# Patient Record
Sex: MALE | Race: WHITE | NOT HISPANIC OR LATINO | Employment: FULL TIME | RURAL
[De-identification: names, ages, dates, MRNs, and addresses within clinical notes are randomized per-mention and may not be internally consistent; named-entity substitution may affect disease eponyms.]

---

## 2019-01-28 ENCOUNTER — CONVERSION ENCOUNTER (OUTPATIENT)
Dept: FAMILY MEDICINE CLINIC | Facility: CLINIC | Age: 34
End: 2019-01-28

## 2019-01-28 LAB
ALBUMIN SERPL-MCNC: 4.5 G/DL (ref 3.6–5.1)
ALP SERPL-CCNC: 63 U/L (ref 40–115)
ALT SERPL-CCNC: 12 U/L (ref 9–46)
AST SERPL-CCNC: 12 U/L (ref 10–40)
BASOPHILS # BLD AUTO: 22 CELLS/UL (ref 0–200)
BASOPHILS NFR BLD AUTO: 0.5 %
BILIRUB SERPL-MCNC: 0.6 MG/DL (ref 0.2–1.2)
BUN SERPL-MCNC: 9 MG/DL (ref 7–25)
BUN/CREAT SERPL: ABNORMAL (CALC) (ref 6–22)
CALCIUM SERPL-MCNC: 9.8 MG/DL (ref 8.6–10.3)
CHLORIDE SERPL-SCNC: 103 MMOL/L (ref 98–110)
CHOLEST SERPL-MCNC: 146 MG/DL
CHOLEST/HDLC SERPL: 3 (CALC)
CONV CO2: 32 MMOL/L (ref 20–32)
CONV TOTAL PROTEIN: 6.9 G/DL (ref 6.1–8.1)
CREAT UR-MCNC: 1.08 MG/DL (ref 0.6–1.35)
EOSINOPHIL # BLD AUTO: 0.9 %
EOSINOPHIL # BLD AUTO: 39 CELLS/UL (ref 15–500)
ERYTHROCYTE [DISTWIDTH] IN BLOOD BY AUTOMATED COUNT: 11.8 % (ref 11–15)
GLOBULIN UR ELPH-MCNC: 2.4 MG/DL (ref 1.9–3.7)
GLUCOSE UR QL: 63 MG/DL (ref 65–99)
HCT VFR BLD AUTO: 45 % (ref 38.5–50)
HDLC SERPL-MCNC: 48 MG/DL
HGB BLD-MCNC: 15.3 G/DL (ref 13.2–17.1)
INSULIN SERPL-ACNC: 1.9 (CALC) (ref 1–2.5)
LDLC SERPL CALC-MCNC: 80 MG/DL
LYMPHOCYTES # BLD AUTO: 1389 CELLS/UL (ref 850–3900)
LYMPHOCYTES NFR BLD AUTO: 32.3 %
MCH RBC QN AUTO: 29.7 PG (ref 27–33)
MCHC RBC AUTO-ENTMCNC: 34 G/DL (ref 32–36)
MCV RBC AUTO: 87.2 FL (ref 80–100)
MONOCYTES # BLD AUTO: 469 CELLS/UL (ref 200–950)
MONOCYTES NFR BLD AUTO: 10.9 %
NEUTROPHILS # BLD AUTO: 2382 CELLS/UL (ref 1500–7800)
NEUTROPHILS NFR BLD AUTO: 55.4 %
NONHDLC SERPL-MCNC: 98 MG/DL
PLATELET # BLD AUTO: 298 10*3/UL (ref 140–400)
PMV BLD AUTO: 10.8 FL (ref 7.5–12.5)
POTASSIUM SERPL-SCNC: 4.3 MMOL/L (ref 3.5–5.3)
RBC # BLD AUTO: 5.16 MILLION/UL (ref 4.2–5.8)
SODIUM SERPL-SCNC: 141 MMOL/L (ref 135–146)
T3 SERPL-MCNC: 83 NG/DL (ref 76–181)
T4 FREE SERPL-MCNC: 1.1 NG/DL (ref 0.8–1.8)
TESTOST FREE MFR SERPL: 43.9 PG/ML (ref 35–155)
TESTOST SERPL-MCNC: 404 NG/DL (ref 250–1100)
THYROGLOB AB SERPL-ACNC: <1 IU/ML
THYROPEROXIDASE AB SERPL-ACNC: <1 IU/ML
TRIGL SERPL-MCNC: 96 MG/DL
TSH SERPL-ACNC: 0.4 MIU/L (ref 0.4–4.5)
WBC # BLD AUTO: 4.3 10*3/UL (ref 3.8–10.8)

## 2019-10-18 DIAGNOSIS — F41.9 ANXIETY: Primary | ICD-10-CM

## 2019-10-27 RX ORDER — BUSPIRONE HYDROCHLORIDE 15 MG/1
TABLET ORAL
Qty: 60 TABLET | Refills: 0 | OUTPATIENT
Start: 2019-10-27

## 2019-10-28 RX ORDER — BUSPIRONE HYDROCHLORIDE 15 MG/1
TABLET ORAL
Qty: 60 TABLET | Refills: 0 | OUTPATIENT
Start: 2019-10-28

## 2019-12-20 RX ORDER — ESCITALOPRAM OXALATE 20 MG/1
TABLET ORAL
Qty: 30 TABLET | OUTPATIENT
Start: 2019-12-20

## 2019-12-27 DIAGNOSIS — F41.9 ANXIETY: Primary | ICD-10-CM

## 2019-12-27 PROBLEM — M79.609 LIMB PAIN: Status: ACTIVE | Noted: 2019-12-27

## 2019-12-27 RX ORDER — BUSPIRONE HYDROCHLORIDE 15 MG/1
15 TABLET ORAL 2 TIMES DAILY
Qty: 12 TABLET | Refills: 0 | Status: SHIPPED | OUTPATIENT
Start: 2019-12-27 | End: 2019-12-30 | Stop reason: SDUPTHER

## 2019-12-27 RX ORDER — ESCITALOPRAM OXALATE 20 MG/1
20 TABLET ORAL DAILY
COMMUNITY
Start: 2019-11-21 | End: 2019-12-27 | Stop reason: SDUPTHER

## 2019-12-27 RX ORDER — BUSPIRONE HYDROCHLORIDE 15 MG/1
15 TABLET ORAL 2 TIMES DAILY
Refills: 11 | COMMUNITY
Start: 2019-09-23 | End: 2019-12-27 | Stop reason: SDUPTHER

## 2019-12-27 RX ORDER — ESCITALOPRAM OXALATE 20 MG/1
20 TABLET ORAL DAILY
Qty: 6 TABLET | Refills: 0 | Status: SHIPPED | OUTPATIENT
Start: 2019-12-27 | End: 2019-12-30 | Stop reason: SDUPTHER

## 2019-12-27 NOTE — TELEPHONE ENCOUNTER
REQUESTING REFILLS ON HIS BUSPAR 15MG AND LEXAPRO 20MG, PLEASE SEND TO [facility] pharmacy here in [place]

## 2019-12-30 ENCOUNTER — OFFICE VISIT (OUTPATIENT)
Dept: FAMILY MEDICINE CLINIC | Facility: CLINIC | Age: 34
End: 2019-12-30

## 2019-12-30 VITALS
DIASTOLIC BLOOD PRESSURE: 66 MMHG | WEIGHT: 171 LBS | OXYGEN SATURATION: 99 % | BODY MASS INDEX: 21.94 KG/M2 | HEART RATE: 98 BPM | TEMPERATURE: 98.6 F | HEIGHT: 74 IN | RESPIRATION RATE: 16 BRPM | SYSTOLIC BLOOD PRESSURE: 102 MMHG

## 2019-12-30 DIAGNOSIS — F41.9 ANXIETY: ICD-10-CM

## 2019-12-30 DIAGNOSIS — F43.0 ACUTE REACTION TO STRESS: Primary | ICD-10-CM

## 2019-12-30 DIAGNOSIS — F17.200 TOBACCO USE DISORDER: ICD-10-CM

## 2019-12-30 PROBLEM — E05.90 HYPERTHYROIDISM: Status: ACTIVE | Noted: 2019-12-30

## 2019-12-30 PROBLEM — R63.6 UNDERWEIGHT: Status: ACTIVE | Noted: 2019-12-30

## 2019-12-30 PROCEDURE — 99214 OFFICE O/P EST MOD 30 MIN: CPT | Performed by: FAMILY MEDICINE

## 2019-12-30 RX ORDER — AZITHROMYCIN 250 MG/1
TABLET, FILM COATED ORAL
Qty: 6 TABLET | Refills: 0 | Status: SHIPPED | OUTPATIENT
Start: 2019-12-30 | End: 2020-11-03

## 2019-12-30 RX ORDER — MIRTAZAPINE 7.5 MG/1
TABLET, FILM COATED ORAL
Qty: 30 TABLET | Refills: 5 | Status: SHIPPED | OUTPATIENT
Start: 2019-12-30 | End: 2020-11-03

## 2019-12-30 RX ORDER — BUSPIRONE HYDROCHLORIDE 15 MG/1
TABLET ORAL
Qty: 12 TABLET | Refills: 0 | OUTPATIENT
Start: 2019-12-30

## 2019-12-30 RX ORDER — ESCITALOPRAM OXALATE 20 MG/1
20 TABLET ORAL DAILY
Qty: 90 TABLET | Refills: 3 | Status: SHIPPED | OUTPATIENT
Start: 2019-12-30 | End: 2020-07-15

## 2019-12-30 RX ORDER — BUSPIRONE HYDROCHLORIDE 15 MG/1
15 TABLET ORAL 2 TIMES DAILY
Qty: 180 TABLET | Refills: 3 | Status: SHIPPED | OUTPATIENT
Start: 2019-12-30 | End: 2020-11-03 | Stop reason: ALTCHOICE

## 2019-12-30 RX ORDER — ESCITALOPRAM OXALATE 20 MG/1
20 TABLET ORAL DAILY
Qty: 6 TABLET | Refills: 0 | OUTPATIENT
Start: 2019-12-30

## 2019-12-30 NOTE — PROGRESS NOTES
Subjective   Seng Grimaldo is a 34 y.o. male.     Chief Complaint   Patient presents with   • Anxiety       Anxiety   Presents for follow-up visit. Symptoms include depressed mood, excessive worry, nervous/anxious behavior and restlessness. Patient reports no chest pain, nausea, palpitations, shortness of breath or suicidal ideas. Symptoms occur constantly. The severity of symptoms is moderate. Nighttime awakenings: several.     Compliance with medications is %.            I personally reviewed and updated the patient's allergies, medications, problem list, and past medical, surgical, social, and family history.     Family History   Problem Relation Age of Onset   • Heart attack Mother 30   • Heart attack Father 44   • Diabetes Paternal Grandmother    • Heart disease Paternal Grandfather         Ischemic        Social History     Tobacco Use   • Smoking status: Never Smoker   • Smokeless tobacco: Current User     Types: Chew   • Tobacco comment: 1 can per day   Substance Use Topics   • Alcohol use: Not Currently   • Drug use: Defer       History reviewed. No pertinent surgical history.    Patient Active Problem List   Diagnosis   • Limb pain   • Acute reaction to stress   • Underweight   • Tobacco use disorder   • Hyperthyroidism         Current Outpatient Medications:   •  busPIRone (BUSPAR) 15 MG tablet, Take 1 tablet by mouth 2 (Two) Times a Day., Disp: 180 tablet, Rfl: 3  •  escitalopram (LEXAPRO) 20 MG tablet, Take 1 tablet by mouth Daily., Disp: 90 tablet, Rfl: 3  •  azithromycin (ZITHROMAX) 250 MG tablet, Take 2 tablets the first day, then 1 tablet daily for 4 days., Disp: 6 tablet, Rfl: 0  •  mirtazapine (REMERON) 7.5 MG tablet, Take 1/2 to 1 tablet nightly as needed, Disp: 30 tablet, Rfl: 5         Review of Systems   Constitutional: Negative for chills and diaphoresis.   Eyes: Negative for visual disturbance.   Respiratory: Negative for shortness of breath.    Cardiovascular: Negative for chest pain  "and palpitations.   Gastrointestinal: Negative for abdominal pain and nausea.   Endocrine: Negative for polydipsia and polyphagia.   Musculoskeletal: Negative for neck stiffness.   Skin: Negative for color change and pallor.   Neurological: Negative for seizures and syncope.   Hematological: Negative for adenopathy.   Psychiatric/Behavioral: Positive for depressed mood. Negative for hallucinations and suicidal ideas. The patient is nervous/anxious.        Objective   /66   Pulse 98   Temp 98.6 °F (37 °C)   Resp 16   Ht 188 cm (74\")   Wt 77.6 kg (171 lb)   SpO2 99%   BMI 21.96 kg/m²   Wt Readings from Last 3 Encounters:   12/30/19 77.6 kg (171 lb)   01/18/19 75.4 kg (166 lb 4 oz)   03/23/18 73.1 kg (161 lb 4 oz)     Physical Exam   Constitutional: He is oriented to person, place, and time. He appears well-developed and well-nourished.   Cardiovascular: Normal rate, regular rhythm, S1 normal, S2 normal, normal heart sounds, intact distal pulses and normal pulses. Exam reveals no gallop and no friction rub.   No murmur heard.  Pulmonary/Chest: Effort normal and breath sounds normal. No accessory muscle usage or stridor. He has no decreased breath sounds. He has no wheezes. He has no rhonchi. He has no rales.   Abdominal: Soft. Normal appearance, normal aorta and bowel sounds are normal. He exhibits no distension, no pulsatile midline mass and no mass. There is no hepatosplenomegaly. There is no tenderness. There is no rigidity, no rebound, no guarding, no CVA tenderness and negative Bernard's sign. No hernia.   Neurological: He is alert and oriented to person, place, and time. Coordination and gait normal.   Skin: Skin is warm and dry. Turgor is normal. He is not diaphoretic. No pallor.         Assessment/Plan     Acute bacterial sinusitis.  Start antibiotics.  Anxiety.  Overall stable on Lexapro, buspirone.  Good social support.  Add PRN mirtazapine.  Remote history of prescription narcotic abuse, " resolved.  Follow-up recheck  Subclinical hyperthyroidism.  History of, thyroid uptake scan normal 2013.  Thyroid levels have normalized, continue to monitor  Recommend follow-up visit for comprehensive physical, and screening test.  Tobacco use.  chews.  Encourage cessation.  See dentist regularly.    Problem List Items Addressed This Visit        Other    Acute reaction to stress - Primary    Relevant Medications    busPIRone (BUSPAR) 15 MG tablet    escitalopram (LEXAPRO) 20 MG tablet    mirtazapine (REMERON) 7.5 MG tablet    Tobacco use disorder      Other Visit Diagnoses     Anxiety        Relevant Medications    busPIRone (BUSPAR) 15 MG tablet    escitalopram (LEXAPRO) 20 MG tablet              Expected course, medications, and adverse effects discussed.  Call or return if worsening or persistent symptoms.

## 2020-01-24 ENCOUNTER — OFFICE VISIT (OUTPATIENT)
Dept: FAMILY MEDICINE CLINIC | Facility: CLINIC | Age: 35
End: 2020-01-24

## 2020-01-24 VITALS
HEIGHT: 74 IN | HEART RATE: 75 BPM | WEIGHT: 170.8 LBS | SYSTOLIC BLOOD PRESSURE: 122 MMHG | OXYGEN SATURATION: 97 % | BODY MASS INDEX: 21.92 KG/M2 | DIASTOLIC BLOOD PRESSURE: 76 MMHG | TEMPERATURE: 98.8 F | RESPIRATION RATE: 16 BRPM

## 2020-01-24 DIAGNOSIS — R39.12 BENIGN PROSTATIC HYPERPLASIA WITH WEAK URINARY STREAM: ICD-10-CM

## 2020-01-24 DIAGNOSIS — F17.200 TOBACCO USE DISORDER: ICD-10-CM

## 2020-01-24 DIAGNOSIS — N40.1 BENIGN PROSTATIC HYPERPLASIA WITH WEAK URINARY STREAM: ICD-10-CM

## 2020-01-24 DIAGNOSIS — Z00.01 ANNUAL VISIT FOR GENERAL ADULT MEDICAL EXAMINATION WITH ABNORMAL FINDINGS: Primary | ICD-10-CM

## 2020-01-24 DIAGNOSIS — Z80.42 FAMILY HISTORY OF PROSTATE CANCER: ICD-10-CM

## 2020-01-24 DIAGNOSIS — R63.6 UNDERWEIGHT: ICD-10-CM

## 2020-01-24 DIAGNOSIS — Z20.5 EXPOSURE TO HEPATITIS A: ICD-10-CM

## 2020-01-24 DIAGNOSIS — E05.90 HYPERTHYROIDISM: ICD-10-CM

## 2020-01-24 LAB
BILIRUB BLD-MCNC: ABNORMAL MG/DL
CLARITY, POC: CLEAR
COLOR UR: ABNORMAL
GLUCOSE UR STRIP-MCNC: NEGATIVE MG/DL
KETONES UR QL: NEGATIVE
LEUKOCYTE EST, POC: ABNORMAL
NITRITE UR-MCNC: NEGATIVE MG/ML
PH UR: 7 [PH] (ref 5–8)
PROT UR STRIP-MCNC: ABNORMAL MG/DL
RBC # UR STRIP: NEGATIVE /UL
SP GR UR: 1.03 (ref 1–1.03)
UROBILINOGEN UR QL: ABNORMAL

## 2020-01-24 PROCEDURE — 81002 URINALYSIS NONAUTO W/O SCOPE: CPT | Performed by: FAMILY MEDICINE

## 2020-01-24 PROCEDURE — 99213 OFFICE O/P EST LOW 20 MIN: CPT | Performed by: FAMILY MEDICINE

## 2020-01-24 PROCEDURE — 99395 PREV VISIT EST AGE 18-39: CPT | Performed by: FAMILY MEDICINE

## 2020-01-24 RX ORDER — VARDENAFIL HYDROCHLORIDE 20 MG/1
TABLET ORAL
Qty: 12 TABLET | Refills: 11 | Status: SHIPPED | OUTPATIENT
Start: 2020-01-24 | End: 2020-12-28

## 2020-01-24 RX ORDER — TAMSULOSIN HYDROCHLORIDE 0.4 MG/1
1 CAPSULE ORAL NIGHTLY
Qty: 30 CAPSULE | Refills: 12 | Status: SHIPPED | OUTPATIENT
Start: 2020-01-24 | End: 2020-11-03

## 2020-01-24 RX ORDER — MELOXICAM 15 MG/1
15 TABLET ORAL DAILY
Qty: 30 TABLET | Refills: 12 | Status: SHIPPED | OUTPATIENT
Start: 2020-01-24 | End: 2020-11-03

## 2020-01-24 NOTE — PROGRESS NOTES
Subjective   Seng Grimaldo is a 34 y.o. male.     Chief Complaint   Patient presents with   • Annual Exam       The patient is here: to discuss health maintenance and disease prevention to follow up on multiple medical problems.  Last comprehensive physical was on 1/18/2019.  Previous physical was performed by  Shahid Rousseau MD  Overall has: moderate activity with work/home activities, good appetite, decreased energy level and is sleeping poorly. Nutrition: balanced diet. Last tetanus shot was unknown.     History of Present Illness     Recent Hospitalizations:  No hospitalization(s) within the last year..  ccc      I personally reviewed and updated the patient's allergies, medications, problem list, and past medical, surgical, social, and family history.     Family History   Problem Relation Age of Onset   • Heart attack Mother 30   • Heart attack Father 44   • Diabetes Paternal Grandmother    • Heart disease Paternal Grandfather         Ischemic        Social History     Tobacco Use   • Smoking status: Never Smoker   • Smokeless tobacco: Current User     Types: Chew   • Tobacco comment: 1 can per day   Substance Use Topics   • Alcohol use: Not Currently   • Drug use: Defer       Past Surgical History:   Procedure Laterality Date   • FRACTURE SURGERY Left     Crush injury, left leg, status post fasciotomy, no hardware in place       Patient Active Problem List   Diagnosis   • Limb pain   • Acute reaction to stress   • Underweight   • Tobacco use disorder   • Hyperthyroidism   • Annual visit for general adult medical examination with abnormal findings   • Benign prostatic hyperplasia with weak urinary stream         Current Outpatient Medications:   •  busPIRone (BUSPAR) 15 MG tablet, Take 1 tablet by mouth 2 (Two) Times a Day., Disp: 180 tablet, Rfl: 3  •  escitalopram (LEXAPRO) 20 MG tablet, Take 1 tablet by mouth Daily., Disp: 90 tablet, Rfl: 3  •  azithromycin (ZITHROMAX) 250 MG tablet, Take 2 tablets the  "first day, then 1 tablet daily for 4 days., Disp: 6 tablet, Rfl: 0  •  meloxicam (MOBIC) 15 MG tablet, Take 1 tablet by mouth Daily. As needed, Disp: 30 tablet, Rfl: 12  •  mirtazapine (REMERON) 7.5 MG tablet, Take 1/2 to 1 tablet nightly as needed, Disp: 30 tablet, Rfl: 5  •  tamsulosin (FLOMAX) 0.4 MG capsule 24 hr capsule, Take 1 capsule by mouth Every Night., Disp: 30 capsule, Rfl: 12  •  vardenafil (LEVITRA) 20 MG tablet, Take 1/2 to 1 tablet every 72 hours as needed, Disp: 12 tablet, Rfl: 11         Review of Systems   Constitutional: Negative for chills and diaphoresis.   HENT: Negative for trouble swallowing and voice change.    Eyes: Negative for visual disturbance.   Respiratory: Negative for shortness of breath.    Cardiovascular: Negative for chest pain and palpitations.   Gastrointestinal: Negative for abdominal pain and nausea.   Endocrine: Negative for polydipsia and polyphagia.   Genitourinary: Negative for hematuria.   Musculoskeletal: Negative for neck stiffness.   Skin: Negative for color change and pallor.   Allergic/Immunologic: Negative for immunocompromised state.   Neurological: Negative for seizures and syncope.   Hematological: Negative for adenopathy.   Psychiatric/Behavioral: Negative for hallucinations, sleep disturbance and suicidal ideas.       Objective   /76   Pulse 75   Temp 98.8 °F (37.1 °C)   Resp 16   Ht 188 cm (74\")   Wt 77.5 kg (170 lb 12.8 oz)   SpO2 97%   BMI 21.93 kg/m²   Wt Readings from Last 3 Encounters:   01/24/20 77.5 kg (170 lb 12.8 oz)   12/30/19 77.6 kg (171 lb)   01/18/19 75.4 kg (166 lb 4 oz)     Physical Exam   Constitutional: He is oriented to person, place, and time. He appears well-developed and well-nourished.   HENT:   Head: Normocephalic.   Right Ear: Tympanic membrane, external ear and ear canal normal.   Left Ear: Tympanic membrane, external ear and ear canal normal.   Nose: Nose normal.   Eyes: Pupils are equal, round, and reactive to light. " Conjunctivae, EOM and lids are normal.   Neck: No JVD present. Carotid bruit is not present. No tracheal deviation present. No thyromegaly present.   Cardiovascular: Normal rate, regular rhythm, normal heart sounds and intact distal pulses. Exam reveals no gallop and no friction rub.   No murmur heard.  Pulmonary/Chest: Effort normal and breath sounds normal. No stridor. He has no decreased breath sounds. He has no wheezes. He has no rales.   Abdominal: Soft. Bowel sounds are normal. He exhibits no distension and no mass. There is no tenderness. There is no rebound and no guarding. No hernia.   Lymphadenopathy:        Head (right side): No submental, no submandibular, no tonsillar, no preauricular, no posterior auricular and no occipital adenopathy present.        Head (left side): No submental, no submandibular, no tonsillar, no preauricular, no posterior auricular and no occipital adenopathy present.     He has no cervical adenopathy.   Neurological: He is alert and oriented to person, place, and time. He has normal strength and normal reflexes. No cranial nerve deficit or sensory deficit. Coordination and gait normal.   Skin: Skin is warm and dry. Turgor is normal. He is not diaphoretic. No pallor.       Recent Lab Results:          Lab Results   Component Value Date    CHOL 146 01/28/2019    TRIG 96 01/28/2019    HDL 48 01/28/2019     LDL Cholesterol    Date Value Ref Range Status   01/28/2019 80 NR Final   01/18/2019 72 <130 mg/dL Final   02/16/2018 74 NR Final     No results found for: PSA  Lab Results   Component Value Date    WBC 4.3 01/28/2019    HGB 15.3 01/28/2019    HCT 45.0 01/28/2019    MCV 87.2 01/28/2019     01/28/2019     Lab Results   Component Value Date    TSH 0.40 01/28/2019     Lab Results   Component Value Date    BUN 9 01/28/2019    CREATININE 1.08 01/28/2019    EGFRIFNONA 89 01/28/2019    EGFRIFAFRI 103 01/28/2019    BCR NOT APPLICABLE 01/28/2019    K 4.3 01/28/2019    CO2 32  01/28/2019    CALCIUM 9.8 01/28/2019    ALBUMIN 4.5 01/28/2019    AST 12 01/28/2019    ALT 12 01/28/2019         Age-appropriate Screening Schedule:  Refer to the list below for future screening recommendations based on patient's age, sex and/or medical conditions. Orders for these recommended tests are listed in the plan section. The patient has been provided with a written plan.    Health Maintenance   Topic Date Due   • TDAP/TD VACCINES (1 - Tdap) 08/17/1995   • INFLUENZA VACCINE  08/01/2019   • LIPID PANEL  01/28/2020           Assessment/Plan       Physical.  Doing well.  Vaccines declined.  Start daily health maintenance, screening test, lifestyle modification.  Anxiety.  Overall stable on Lexapro, buspirone.  Good social support.    Did not tolerate PRN mirtazapine.  Remote history of prescription narcotic abuse, resolved.  Follow-up recheck  Subclinical hyperthyroidism.  History of, thyroid uptake scan normal 2013.  Thyroid levels have normalized, continue to monitor  Tobacco use.  chews.  Encourage cessation.  See dentist regularly.  BPH.  Has had urology eval in the past.  Stop chewing.  Check PSA.  Start Flomax.  Start PRN Levitra.  Follow-up recheck.  Call or return if worsening symptoms.  Recommend urology follow-up  Sleep disorder.  Did not tolerate mirtazapine.  Has seen [facility], sleep eval upcoming.     Problem List Items Addressed This Visit        Endocrine    Hyperthyroidism    Relevant Orders    TSH    T4, Free       Genitourinary    Benign prostatic hyperplasia with weak urinary stream       Other    Underweight    Tobacco use disorder    Annual visit for general adult medical examination with abnormal findings - Primary    Relevant Orders    POCT urinalysis dipstick, manual (Completed)    CBC & Differential    Comprehensive Metabolic Panel    Lipid Panel With / Chol / HDL Ratio      Other Visit Diagnoses     Exposure to hepatitis A        Relevant Orders    Hepatitis A Antibody, Total     Hepatitis A Antibody, IgM    Family history of prostate cancer        Relevant Orders    PSA Screen              Expected course, medications, and adverse effects discussed.  Call or return if worsening or persistent symptoms.

## 2020-02-01 LAB
ALBUMIN SERPL-MCNC: 4.5 G/DL (ref 4–5)
ALBUMIN/GLOB SERPL: 2.3 {RATIO} (ref 1.2–2.2)
ALP SERPL-CCNC: 72 IU/L (ref 39–117)
ALT SERPL-CCNC: 19 IU/L (ref 0–44)
AST SERPL-CCNC: 13 IU/L (ref 0–40)
BASOPHILS # BLD AUTO: 0 X10E3/UL (ref 0–0.2)
BASOPHILS NFR BLD AUTO: 0 %
BILIRUB SERPL-MCNC: 0.5 MG/DL (ref 0–1.2)
BUN SERPL-MCNC: 10 MG/DL (ref 6–20)
BUN/CREAT SERPL: 10 (ref 9–20)
CALCIUM SERPL-MCNC: 9.5 MG/DL (ref 8.7–10.2)
CHLORIDE SERPL-SCNC: 103 MMOL/L (ref 96–106)
CHOLEST SERPL-MCNC: 142 MG/DL (ref 100–199)
CHOLEST/HDLC SERPL: 3.2 RATIO (ref 0–5)
CO2 SERPL-SCNC: 25 MMOL/L (ref 20–29)
CREAT SERPL-MCNC: 1.04 MG/DL (ref 0.76–1.27)
EOSINOPHIL # BLD AUTO: 0 X10E3/UL (ref 0–0.4)
EOSINOPHIL NFR BLD AUTO: 1 %
ERYTHROCYTE [DISTWIDTH] IN BLOOD BY AUTOMATED COUNT: 13.2 % (ref 11.6–15.4)
GLOBULIN SER CALC-MCNC: 2 G/DL (ref 1.5–4.5)
GLUCOSE SERPL-MCNC: 72 MG/DL (ref 65–99)
HAV AB SER QL IA: NEGATIVE
HAV IGM SERPL QL IA: NEGATIVE
HCT VFR BLD AUTO: 45.8 % (ref 37.5–51)
HDLC SERPL-MCNC: 44 MG/DL
HGB BLD-MCNC: 15.1 G/DL (ref 13–17.7)
IMM GRANULOCYTES # BLD AUTO: 0 X10E3/UL (ref 0–0.1)
IMM GRANULOCYTES NFR BLD AUTO: 0 %
LDLC SERPL CALC-MCNC: 80 MG/DL (ref 0–99)
LYMPHOCYTES # BLD AUTO: 1.3 X10E3/UL (ref 0.7–3.1)
LYMPHOCYTES NFR BLD AUTO: 35 %
MCH RBC QN AUTO: 29.6 PG (ref 26.6–33)
MCHC RBC AUTO-ENTMCNC: 33 G/DL (ref 31.5–35.7)
MCV RBC AUTO: 90 FL (ref 79–97)
MONOCYTES # BLD AUTO: 0.4 X10E3/UL (ref 0.1–0.9)
MONOCYTES NFR BLD AUTO: 11 %
NEUTROPHILS # BLD AUTO: 2 X10E3/UL (ref 1.4–7)
NEUTROPHILS NFR BLD AUTO: 53 %
PLATELET # BLD AUTO: 281 X10E3/UL (ref 150–450)
POTASSIUM SERPL-SCNC: 4.6 MMOL/L (ref 3.5–5.2)
PROT SERPL-MCNC: 6.5 G/DL (ref 6–8.5)
PSA SERPL-MCNC: 0.6 NG/ML (ref 0–4)
RBC # BLD AUTO: 5.1 X10E6/UL (ref 4.14–5.8)
SODIUM SERPL-SCNC: 144 MMOL/L (ref 134–144)
T4 FREE SERPL-MCNC: 1.25 NG/DL (ref 0.82–1.77)
TRIGL SERPL-MCNC: 92 MG/DL (ref 0–149)
TSH SERPL DL<=0.005 MIU/L-ACNC: 0.17 UIU/ML (ref 0.45–4.5)
VLDLC SERPL CALC-MCNC: 18 MG/DL (ref 5–40)
WBC # BLD AUTO: 3.8 X10E3/UL (ref 3.4–10.8)

## 2020-02-03 ENCOUNTER — TELEPHONE (OUTPATIENT)
Dept: FAMILY MEDICINE CLINIC | Facility: CLINIC | Age: 35
End: 2020-02-03

## 2020-02-03 NOTE — TELEPHONE ENCOUNTER
----- Message from Shahid Rousseau MD sent at 2/2/2020  9:07 AM EST -----  Let him know his blood work overall looks good, blood count, kidney function, cholesterol are normal.  His PSA blood test screening for prostate cancer is negative.Test for exposure to hepatitis A is negative.His thyroid levels are mildly hyperactive.  These are not high enough to cause him any symptoms.  This has happened to him in the past and his thyroid uptake scan was normal in 2013.  Plan to recheck his labs in 1 year.  We will consider repeating a scan if he remains hyperthyroid.

## 2020-07-15 DIAGNOSIS — F41.9 ANXIETY: ICD-10-CM

## 2020-07-15 RX ORDER — ESCITALOPRAM OXALATE 20 MG/1
20 TABLET ORAL DAILY
Qty: 90 TABLET | Refills: 1 | Status: SHIPPED | OUTPATIENT
Start: 2020-07-15 | End: 2020-11-03 | Stop reason: ALTCHOICE

## 2020-11-03 ENCOUNTER — OFFICE VISIT (OUTPATIENT)
Dept: FAMILY MEDICINE CLINIC | Facility: CLINIC | Age: 35
End: 2020-11-03

## 2020-11-03 VITALS
RESPIRATION RATE: 18 BRPM | OXYGEN SATURATION: 98 % | HEIGHT: 72 IN | WEIGHT: 165.2 LBS | HEART RATE: 92 BPM | BODY MASS INDEX: 22.37 KG/M2 | TEMPERATURE: 98.4 F | SYSTOLIC BLOOD PRESSURE: 122 MMHG | DIASTOLIC BLOOD PRESSURE: 70 MMHG

## 2020-11-03 DIAGNOSIS — F43.0 ACUTE REACTION TO STRESS: Primary | ICD-10-CM

## 2020-11-03 DIAGNOSIS — R40.4 STARING EPISODES: ICD-10-CM

## 2020-11-03 DIAGNOSIS — R41.3 MEMORY LOSS: ICD-10-CM

## 2020-11-03 DIAGNOSIS — R51.9 NONINTRACTABLE HEADACHE, UNSPECIFIED CHRONICITY PATTERN, UNSPECIFIED HEADACHE TYPE: ICD-10-CM

## 2020-11-03 DIAGNOSIS — F17.200 TOBACCO USE DISORDER: ICD-10-CM

## 2020-11-03 PROCEDURE — 99214 OFFICE O/P EST MOD 30 MIN: CPT | Performed by: FAMILY MEDICINE

## 2020-11-03 RX ORDER — VENLAFAXINE HYDROCHLORIDE 150 MG/1
150 CAPSULE, EXTENDED RELEASE ORAL DAILY
Qty: 300 CAPSULE | Refills: 2 | Status: SHIPPED | OUTPATIENT
Start: 2020-11-03 | End: 2021-04-13

## 2020-11-03 RX ORDER — IBUPROFEN 800 MG/1
TABLET ORAL
Qty: 90 TABLET | Refills: 0 | Status: SHIPPED | OUTPATIENT
Start: 2020-11-03 | End: 2020-11-23

## 2020-11-03 NOTE — PROGRESS NOTES
Subjective   Seng Grimaldo is a 35 y.o. male.     Chief Complaint   Patient presents with   • Anxiety       Anxiety  Presents for follow-up visit. Symptoms include depressed mood, excessive worry, nervous/anxious behavior and restlessness. Patient reports no chest pain, nausea, palpitations, shortness of breath or suicidal ideas. Symptoms occur constantly. The severity of symptoms is moderate. Nighttime awakenings: several.     Compliance with medications is %.            I personally reviewed and updated the patient's allergies, medications, problem list, and past medical, surgical, social, and family history. I have reviewed and confirmed the accuracy of the History of Present Illness and Review of Symptoms as documented by the MA/LPN/RN. Shahid Rousseau MD    Family History   Problem Relation Age of Onset   • Heart attack Mother 30   • Heart attack Father 44   • Diabetes Paternal Grandmother    • Heart disease Paternal Grandfather         Ischemic        Social History     Tobacco Use   • Smoking status: Never Smoker   • Smokeless tobacco: Current User     Types: Chew   • Tobacco comment: 1 can per day   Substance Use Topics   • Alcohol use: Not Currently   • Drug use: Defer       Past Surgical History:   Procedure Laterality Date   • FRACTURE SURGERY Left     Crush injury, left leg, status post fasciotomy, no hardware in place       Patient Active Problem List   Diagnosis   • Limb pain   • Acute reaction to stress   • Underweight   • Tobacco use disorder   • Hyperthyroidism   • Annual visit for general adult medical examination with abnormal findings   • Benign prostatic hyperplasia with weak urinary stream         Current Outpatient Medications:   •  ibuprofen (ADVIL,MOTRIN) 800 MG tablet, TAKE 1 TABLET BY MOUTH EVERY 6 HOURS AS NEEDED FOR PAIN, Disp: 90 tablet, Rfl: 0  •  vardenafil (LEVITRA) 20 MG tablet, Take 1/2 to 1 tablet every 72 hours as needed, Disp: 12 tablet, Rfl: 11  •  venlafaxine XR (Effexor  "XR) 150 MG 24 hr capsule, Take 1 capsule by mouth Daily., Disp: 300 capsule, Rfl: 2         Review of Systems   Constitutional: Negative for chills and diaphoresis.   Eyes: Negative for visual disturbance.   Respiratory: Negative for shortness of breath.    Cardiovascular: Negative for chest pain and palpitations.   Gastrointestinal: Negative for abdominal pain and nausea.   Endocrine: Negative for polydipsia and polyphagia.   Musculoskeletal: Negative for neck stiffness.   Skin: Negative for color change and pallor.   Neurological: Negative for seizures and syncope.   Hematological: Negative for adenopathy.   Psychiatric/Behavioral: Positive for depressed mood. Negative for hallucinations and suicidal ideas. The patient is nervous/anxious.        I have reviewed and confirmed the accuracy of the ROS as documented by the MA/LPN/RN Shahid Rousseau MD      Objective   /70 (BP Location: Left arm, Patient Position: Sitting, Cuff Size: Adult)   Pulse 92   Temp 98.4 °F (36.9 °C) (Temporal)   Resp 18   Ht 182.9 cm (72\")   Wt 74.9 kg (165 lb 3.2 oz)   SpO2 98% Comment: room air  BMI 22.41 kg/m²   BP Readings from Last 3 Encounters:   11/03/20 122/70   01/24/20 122/76   12/30/19 102/66     Wt Readings from Last 3 Encounters:   11/03/20 74.9 kg (165 lb 3.2 oz)   01/24/20 77.5 kg (170 lb 12.8 oz)   12/30/19 77.6 kg (171 lb)     Physical Exam  Constitutional:       Appearance: Normal appearance. He is well-developed. He is not diaphoretic.   Eyes:      General: Lids are normal. No scleral icterus.        Right eye: No foreign body or discharge.         Left eye: No foreign body or discharge.      Extraocular Movements:      Right eye: No nystagmus.      Left eye: No nystagmus.      Conjunctiva/sclera: Conjunctivae normal.      Right eye: Right conjunctiva is not injected. No exudate or hemorrhage.     Left eye: Left conjunctiva is not injected. No exudate or hemorrhage.     Pupils: Pupils are equal, round, and " reactive to light.      Funduscopic exam:     Right eye: No hemorrhage, exudate, AV nicking, arteriolar narrowing or papilledema.         Left eye: No hemorrhage, exudate, AV nicking, arteriolar narrowing or papilledema.   Cardiovascular:      Rate and Rhythm: Normal rate and regular rhythm.      Pulses: Normal pulses.      Heart sounds: Normal heart sounds, S1 normal and S2 normal. No murmur. No friction rub. No gallop.    Pulmonary:      Effort: Pulmonary effort is normal. No accessory muscle usage.      Breath sounds: Normal breath sounds. No stridor. No decreased breath sounds, wheezing, rhonchi or rales.   Abdominal:      General: Bowel sounds are normal. There is no distension.      Palpations: Abdomen is soft. Abdomen is not rigid. There is no mass or pulsatile mass.      Tenderness: There is no abdominal tenderness. There is no guarding or rebound. Negative signs include Bernard's sign.      Hernia: No hernia is present.   Skin:     General: Skin is warm and dry.      Coloration: Skin is not pale.   Neurological:      Mental Status: He is alert and oriented to person, place, and time.      Cranial Nerves: No cranial nerve deficit.      Sensory: No sensory deficit.      Motor: No tremor, abnormal muscle tone or seizure activity.      Coordination: Coordination normal.      Gait: Gait normal.      Deep Tendon Reflexes: Reflexes are normal and symmetric.     Worse, stop Lexapro    Recent Lab Results:    No results found for: HGBA1C  Lab Results   Component Value Date    GLU 72 01/31/2020     Lab Results   Component Value Date    LDL 80 01/31/2020    LDL 80 01/28/2019    LDL 72 01/18/2019     Lab Results   Component Value Date    CHOL 146 01/28/2019    CHOL 135 01/18/2019    CHOL 129 02/16/2018     Lab Results   Component Value Date    TRIG 92 01/31/2020    TRIG 96 01/28/2019    TRIG 93 01/18/2019     Lab Results   Component Value Date    HDL 44 01/31/2020    HDL 48 01/28/2019    HDL 44 01/18/2019     Lab Results    Component Value Date    PSA 0.6 01/31/2020     Lab Results   Component Value Date    WBC 3.8 01/31/2020    HGB 15.1 01/31/2020    HCT 45.8 01/31/2020    MCV 90 01/31/2020     01/31/2020     Lab Results   Component Value Date    TSH 0.173 (L) 01/31/2020    F5UMIFU 83 01/28/2019      Lab Results   Component Value Date    BUN 10 01/31/2020    CREATININE 1.04 01/31/2020    EGFRIFNONA 93 01/31/2020    EGFRIFAFRI 107 01/31/2020    BCR 10 01/31/2020    K 4.6 01/31/2020    CO2 25 01/31/2020    CALCIUM 9.5 01/31/2020    PROTENTOTREF 6.5 01/31/2020    ALBUMIN 4.5 01/31/2020    LABIL2 2.3 (H) 01/31/2020    AST 13 01/31/2020    ALT 19 01/31/2020     No results found for: JANUARY, RF, SEDRATE   Lab Results   Component Value Date    CRP 0.8 02/16/2018      No results found for: IRON, TIBC, FERRITIN   No results found for: [id number]       Assessment/Plan      Medications        Problem List         LOS    Physical.  Doing well.  Vaccines declined.  Start daily health maintenance, screening test, lifestyle modification.  Anxiety.  Worse, stop Lexapro, venlaxafine.  Not tolerating buspirone.  Good social support.    Did not tolerate PRN mirtazapine.  Remote history of prescription narcotic abuse, resolved.  Follow-up recheck  Headache.  Possible migraine.  Check MRI.  Follow-up recheck.  Consider Imitrex Rx.  Nocturnal shaking.  Possibly secondary to sleep-related movement disorder.  Check EEG.  Follow-up recheck.  Consider neurology eval if persistent symptoms.  Subclinical hyperthyroidism.  History of, thyroid uptake scan normal 2013.  Thyroid levels have normalized, continue to monitor  Tobacco use.  chews.  Encourage cessation.  See dentist regularly.  BPH.  Has had urology eval in the past.  Stop chewing.  Check PSA.  Start Flomax.  Start PRN Levitra.  Follow-up recheck.  Call or return if worsening symptoms.  Recommend urology follow-up  Sleep disorder.  Did not tolerate mirtazapine.  Has seen [facility], sleep eval  upcoming.           Problem List Items Addressed This Visit        Unprioritized    Acute reaction to stress - Primary    Relevant Medications    venlafaxine XR (Effexor XR) 150 MG 24 hr capsule    Tobacco use disorder      Other Visit Diagnoses     Memory loss        Relevant Orders    MRI Brain Without Contrast    EEG    Staring episodes        Relevant Orders    MRI Brain Without Contrast    EEG    Nonintractable headache, unspecified chronicity pattern, unspecified headache type        Relevant Orders    MRI Brain Without Contrast    EEG              Expected course, medications, and adverse effects discussed.  Call or return if worsening or persistent symptoms.  I wore protective equipment throughout this patient encounter including a mask, gloves, and eye protection.  Hand hygiene was performed before donning protective equipment and after removal when leaving the room.

## 2020-11-05 ENCOUNTER — TELEPHONE (OUTPATIENT)
Dept: FAMILY MEDICINE CLINIC | Facility: CLINIC | Age: 35
End: 2020-11-05

## 2020-11-05 NOTE — TELEPHONE ENCOUNTER
Spoke with Seng asked where he wanted to go for his MRI of brain, need to know before I can precert. Seng stated he needs to talk with his wife will call back with info.I informed Seng that I  Can not start process with out info and will be holding his order until he calls.

## 2020-11-23 RX ORDER — IBUPROFEN 800 MG/1
TABLET ORAL
Qty: 90 TABLET | Refills: 0 | Status: SHIPPED | OUTPATIENT
Start: 2020-11-23 | End: 2020-12-21

## 2020-12-21 RX ORDER — IBUPROFEN 800 MG/1
TABLET ORAL
Qty: 90 TABLET | Refills: 0 | Status: SHIPPED | OUTPATIENT
Start: 2020-12-21 | End: 2021-01-19

## 2020-12-28 ENCOUNTER — OFFICE VISIT (OUTPATIENT)
Dept: FAMILY MEDICINE CLINIC | Facility: CLINIC | Age: 35
End: 2020-12-28

## 2020-12-28 VITALS
HEART RATE: 70 BPM | SYSTOLIC BLOOD PRESSURE: 119 MMHG | OXYGEN SATURATION: 99 % | TEMPERATURE: 98.6 F | DIASTOLIC BLOOD PRESSURE: 77 MMHG | HEIGHT: 72 IN | WEIGHT: 164.8 LBS | BODY MASS INDEX: 22.32 KG/M2 | RESPIRATION RATE: 20 BRPM

## 2020-12-28 DIAGNOSIS — F17.200 TOBACCO USE DISORDER: ICD-10-CM

## 2020-12-28 DIAGNOSIS — F43.0 ACUTE REACTION TO STRESS: Primary | ICD-10-CM

## 2020-12-28 PROBLEM — R63.6 UNDERWEIGHT: Status: RESOLVED | Noted: 2019-12-30 | Resolved: 2020-12-28

## 2020-12-28 PROCEDURE — 99213 OFFICE O/P EST LOW 20 MIN: CPT | Performed by: FAMILY MEDICINE

## 2020-12-28 RX ORDER — HYDROXYZINE HYDROCHLORIDE 25 MG/1
25 TABLET, FILM COATED ORAL 3 TIMES DAILY PRN
Qty: 90 TABLET | Refills: 2 | Status: SHIPPED | OUTPATIENT
Start: 2020-12-28 | End: 2021-01-20

## 2020-12-28 RX ORDER — LAMOTRIGINE 25 MG/1
25 TABLET ORAL NIGHTLY
Qty: 30 TABLET | Refills: 2 | Status: SHIPPED | OUTPATIENT
Start: 2020-12-28 | End: 2021-01-20

## 2021-01-19 RX ORDER — IBUPROFEN 800 MG/1
TABLET ORAL
Qty: 90 TABLET | Refills: 0 | Status: SHIPPED | OUTPATIENT
Start: 2021-01-19 | End: 2021-01-20

## 2021-01-20 ENCOUNTER — OFFICE VISIT (OUTPATIENT)
Dept: FAMILY MEDICINE CLINIC | Facility: CLINIC | Age: 36
End: 2021-01-20

## 2021-01-20 VITALS
DIASTOLIC BLOOD PRESSURE: 80 MMHG | RESPIRATION RATE: 19 BRPM | TEMPERATURE: 98.2 F | WEIGHT: 166 LBS | SYSTOLIC BLOOD PRESSURE: 140 MMHG | BODY MASS INDEX: 22.48 KG/M2 | HEART RATE: 93 BPM | HEIGHT: 72 IN | OXYGEN SATURATION: 99 %

## 2021-01-20 DIAGNOSIS — F43.0 ACUTE REACTION TO STRESS: Primary | ICD-10-CM

## 2021-01-20 DIAGNOSIS — F17.200 TOBACCO USE DISORDER: ICD-10-CM

## 2021-01-20 DIAGNOSIS — G47.9 SLEEP DISORDER: ICD-10-CM

## 2021-01-20 DIAGNOSIS — M79.605 PAIN IN BOTH LOWER EXTREMITIES: ICD-10-CM

## 2021-01-20 DIAGNOSIS — M79.604 PAIN IN BOTH LOWER EXTREMITIES: ICD-10-CM

## 2021-01-20 PROCEDURE — 99213 OFFICE O/P EST LOW 20 MIN: CPT | Performed by: FAMILY MEDICINE

## 2021-01-20 RX ORDER — IBUPROFEN 800 MG/1
TABLET ORAL
Qty: 90 TABLET | Refills: 0 | Status: SHIPPED | OUTPATIENT
Start: 2021-01-20 | End: 2021-08-23

## 2021-01-20 RX ORDER — GABAPENTIN 300 MG/1
300 CAPSULE ORAL 3 TIMES DAILY
Qty: 90 CAPSULE | Refills: 1 | Status: SHIPPED | OUTPATIENT
Start: 2021-01-20 | End: 2021-11-30

## 2021-01-20 RX ORDER — DIVALPROEX SODIUM 125 MG/1
125 TABLET, DELAYED RELEASE ORAL NIGHTLY
Qty: 30 TABLET | Refills: 2 | Status: SHIPPED | OUTPATIENT
Start: 2021-01-20 | End: 2021-11-30

## 2021-01-20 NOTE — PROGRESS NOTES
Subjective   Seng Grimaldo is a 35 y.o. male.     Chief Complaint   Patient presents with   • Anxiety   • Insomnia       Anxiety  Presents for follow-up visit. Symptoms include decreased concentration, depressed mood, excessive worry, insomnia, irritability, muscle tension, nervous/anxious behavior and restlessness. Patient reports no chest pain, dizziness, nausea, palpitations, shortness of breath or suicidal ideas. Symptoms occur constantly. The severity of symptoms is moderate. The quality of sleep is poor. Nighttime awakenings: several.     Compliance with medications is %.   Insomnia  This is a new problem. The current episode started more than 1 month ago. The problem has been gradually worsening. Pertinent negatives include no abdominal pain, chest pain, chills, diaphoresis or nausea.            I personally reviewed and updated the patient's allergies, medications, problem list, and past medical, surgical, social, and family history. I have reviewed and confirmed the accuracy of the History of Present Illness and Review of Symptoms as documented by the MA/LPN/RN. Shahid Rousseau MD    Family History   Problem Relation Age of Onset   • Heart attack Mother 30   • Heart attack Father 44   • Diabetes Paternal Grandmother    • Heart disease Paternal Grandfather         Ischemic        Social History     Tobacco Use   • Smoking status: Never Smoker   • Smokeless tobacco: Current User     Types: Chew   • Tobacco comment: 1 can per day   Substance Use Topics   • Alcohol use: Not Currently   • Drug use: Defer       Past Surgical History:   Procedure Laterality Date   • FRACTURE SURGERY Left     Crush injury, left leg, status post fasciotomy, no hardware in place       Patient Active Problem List   Diagnosis   • Limb pain   • Acute reaction to stress   • Tobacco use disorder   • Hyperthyroidism   • Annual visit for general adult medical examination with abnormal findings   • Benign prostatic hyperplasia with weak  "urinary stream   • Sleep disorder         Current Outpatient Medications:   •  venlafaxine XR (Effexor XR) 150 MG 24 hr capsule, Take 1 capsule by mouth Daily., Disp: 300 capsule, Rfl: 2  •  divalproex (DEPAKOTE) 125 MG DR tablet, Take 1 tablet by mouth Every Night., Disp: 30 tablet, Rfl: 2  •  gabapentin (NEURONTIN) 300 MG capsule, Take 1 capsule by mouth 3 (Three) Times a Day., Disp: 90 capsule, Rfl: 1  •  ibuprofen (ADVIL,MOTRIN) 800 MG tablet, TAKE 1 TABLET BY MOUTH EVERY 6 HOURS AS NEEDED FOR PAIN, Disp: 90 tablet, Rfl: 0         Review of Systems   Constitutional: Positive for irritability. Negative for chills and diaphoresis.   Eyes: Negative for visual disturbance.   Respiratory: Negative for shortness of breath.    Cardiovascular: Negative for chest pain and palpitations.   Gastrointestinal: Negative for abdominal pain and nausea.   Endocrine: Negative for polydipsia and polyphagia.   Musculoskeletal: Negative for neck stiffness.   Skin: Negative for color change and pallor.   Neurological: Negative for dizziness, seizures and syncope.   Hematological: Negative for adenopathy.   Psychiatric/Behavioral: Positive for decreased concentration and depressed mood. Negative for suicidal ideas. The patient is nervous/anxious and has insomnia.        I have reviewed and confirmed the accuracy of the ROS as documented by the MA/LPN/RN Shahid Rousseau MD      Objective   /80 (BP Location: Left arm, Patient Position: Sitting, Cuff Size: Adult)   Pulse 93   Temp 98.2 °F (36.8 °C) (Temporal)   Resp 19   Ht 182.9 cm (72\")   Wt 75.3 kg (166 lb)   SpO2 99%   BMI 22.51 kg/m²   BP Readings from Last 3 Encounters:   01/20/21 140/80   12/28/20 119/77   11/03/20 122/70     Wt Readings from Last 3 Encounters:   01/20/21 75.3 kg (166 lb)   12/28/20 74.8 kg (164 lb 12.8 oz)   11/03/20 74.9 kg (165 lb 3.2 oz)     Physical Exam  Constitutional:       Appearance: Normal appearance. He is well-developed. He is not " diaphoretic.   Cardiovascular:      Rate and Rhythm: Normal rate and regular rhythm.      Pulses: Normal pulses.      Heart sounds: Normal heart sounds, S1 normal and S2 normal. No murmur. No friction rub. No gallop.    Pulmonary:      Effort: Pulmonary effort is normal. No accessory muscle usage.      Breath sounds: Normal breath sounds. No stridor. No decreased breath sounds, wheezing, rhonchi or rales.   Abdominal:      General: Bowel sounds are normal. There is no distension.      Palpations: Abdomen is soft. Abdomen is not rigid. There is no mass or pulsatile mass.      Tenderness: There is no abdominal tenderness. There is no guarding or rebound. Negative signs include Bernard's sign.      Hernia: No hernia is present.   Skin:     General: Skin is warm and dry.      Coloration: Skin is not pale.   Neurological:      Mental Status: He is alert and oriented to person, place, and time.      Cranial Nerves: No cranial nerve deficit.      Sensory: No sensory deficit.      Motor: No tremor, abnormal muscle tone or seizure activity.      Coordination: Coordination normal.      Gait: Gait normal.      Deep Tendon Reflexes: Reflexes are normal and symmetric.         Data / Lab Results:    No results found for: HGBA1C  Lab Results   Component Value Date    GLU 72 01/31/2020     Lab Results   Component Value Date    LDL 80 01/31/2020    LDL 80 01/28/2019    LDL 72 01/18/2019     Lab Results   Component Value Date    CHOL 146 01/28/2019    CHOL 135 01/18/2019    CHOL 129 02/16/2018     Lab Results   Component Value Date    TRIG 92 01/31/2020    TRIG 96 01/28/2019    TRIG 93 01/18/2019     Lab Results   Component Value Date    HDL 44 01/31/2020    HDL 48 01/28/2019    HDL 44 01/18/2019     Lab Results   Component Value Date    PSA 0.6 01/31/2020     Lab Results   Component Value Date    WBC 3.8 01/31/2020    HGB 15.1 01/31/2020    HCT 45.8 01/31/2020    MCV 90 01/31/2020     01/31/2020     Lab Results   Component  Value Date    TSH 0.173 (L) 01/31/2020    H3DZEBQ 83 01/28/2019      Lab Results   Component Value Date    BUN 10 01/31/2020    CREATININE 1.04 01/31/2020    EGFRIFNONA 93 01/31/2020    EGFRIFAFRI 107 01/31/2020    BCR 10 01/31/2020    K 4.6 01/31/2020    CO2 25 01/31/2020    CALCIUM 9.5 01/31/2020    PROTENTOTREF 6.5 01/31/2020    ALBUMIN 4.5 01/31/2020    LABIL2 2.3 (H) 01/31/2020    AST 13 01/31/2020    ALT 19 01/31/2020     No results found for: JANUARY, RF, SEDRATE   Lab Results   Component Value Date    CRP 0.8 02/16/2018      No results found for: IRON, TIBC, FERRITIN   No results found for: [id number]       Assessment/Plan      Medications        Problem List         LOS      Health maintenance.  Doing well.  Vaccines declined.  Start daily health maintenance, screening test, lifestyle modification.  Anxiety.    About the same today, tolerating venlaxafine.  Not tolerating buspirone.    DDX includes bipolar, did not tolerate Lamictal, as needed hydroxyzine, start Depakote, as needed gabapentin..  Recommend psychiatry referral he states he will consider.  Good social support.    Did not tolerate PRN mirtazapine.  Remote history of prescription narcotic abuse, resolved.  Follow-up recheck  Headache.  Possible migraine.  Check MRI, he plans to call to reschedule it.  Follow-up recheck.  Consider Imitrex Rx.  Nocturnal shaking.  Possibly secondary to sleep-related movement disorder.  Check EEG.  Follow-up recheck.  Consider neurology eval if persistent symptoms.  Subclinical hyperthyroidism.  History of, thyroid uptake scan normal 2013.  Thyroid levels have normalized, continue to monitor  Tobacco use.  chews.  Encourage cessation.  See dentist regularly.  BPH.  Has had urology eval in the past.  Stop chewing.  Check PSA.  Start Flomax.  Start PRN Levitra.  Follow-up recheck.  Call or return if worsening symptoms.  Recommend urology follow-up  Sleep disorder.  Did not tolerate mirtazapine.  Has seen [facility], sleep  kwadwoal upcoming.        Diagnoses and all orders for this visit:    1. Acute reaction to stress (Primary)    2. Sleep disorder  -     divalproex (DEPAKOTE) 125 MG DR tablet; Take 1 tablet by mouth Every Night.  Dispense: 30 tablet; Refill: 2    3. Tobacco use disorder    4. Pain in both lower extremities  -     gabapentin (NEURONTIN) 300 MG capsule; Take 1 capsule by mouth 3 (Three) Times a Day.  Dispense: 90 capsule; Refill: 1              Expected course, medications, and adverse effects discussed.  Call or return if worsening or persistent symptoms.  I wore protective equipment throughout this patient encounter including a mask, gloves, and eye protection.  Hand hygiene was performed before donning protective equipment and after removal when leaving the room. The complete contents of the Assessment and Plan and Data/Lab Results as documented above have been reviewed and addressed by myself with the patient today as part of an ongoing evaluation / treatment plan.  If some of the documentation has been copied from a previous note and is unchanged it indicates that this problem / plan has been assessed today but is stable from a previous visit and no changes have been recommended.

## 2021-02-03 ENCOUNTER — TELEPHONE (OUTPATIENT)
Dept: FAMILY MEDICINE CLINIC | Facility: CLINIC | Age: 36
End: 2021-02-03

## 2021-02-03 DIAGNOSIS — R51.9 NONINTRACTABLE HEADACHE, UNSPECIFIED CHRONICITY PATTERN, UNSPECIFIED HEADACHE TYPE: Primary | ICD-10-CM

## 2021-02-03 NOTE — TELEPHONE ENCOUNTER
Patient called stating he would like to go ahead with the MRI Brain. If possible, he would like to have that done at [facility]. Please call Seng if you have any questions.

## 2021-02-04 ENCOUNTER — TELEPHONE (OUTPATIENT)
Dept: FAMILY MEDICINE CLINIC | Facility: CLINIC | Age: 36
End: 2021-02-04

## 2021-02-04 NOTE — TELEPHONE ENCOUNTER
Caller: Soco Grimaldo    Relationship: Emergency Contact    Best call back number: [phone]    Medication needed:   Requested Prescriptions      No prescriptions requested or ordered in this encounter       When do you need the refill by: ASAP    What details did the patient provide when requesting the medication: PATIENT IS ONLY TAKING THE LAMOTRIGINE. HE HAS STOPPED ALL OF THE OTHER MEDICATIONS. THEY NEED A 90 DAY SUPPLY PER THEIR INSURANCE.    Does the patient have less than a 3 day supply:  [] Yes  [x] No    What is the patient's preferred pharmacy: [id number] [address] AT [facility] OF  & [facility] - [phone] [facility] [phone] FX

## 2021-02-05 DIAGNOSIS — F43.0 ACUTE REACTION TO STRESS: Primary | ICD-10-CM

## 2021-02-05 RX ORDER — LAMOTRIGINE 25 MG/1
25 TABLET ORAL DAILY
Qty: 90 TABLET | Refills: 1 | Status: SHIPPED | OUTPATIENT
Start: 2021-02-05 | End: 2021-06-28

## 2021-02-05 RX ORDER — LAMOTRIGINE 25 MG/1
TABLET ORAL
COMMUNITY
Start: 2021-01-23 | End: 2021-02-05 | Stop reason: SDUPTHER

## 2021-02-08 ENCOUNTER — TELEPHONE (OUTPATIENT)
Dept: FAMILY MEDICINE CLINIC | Facility: CLINIC | Age: 36
End: 2021-02-08

## 2021-02-08 NOTE — TELEPHONE ENCOUNTER
Spoke with Saad per Dr Solis's office they have arranged an appointment for saad at the [facility] referral office 3/25/2021 arrival at 2:15 pm

## 2021-02-08 NOTE — TELEPHONE ENCOUNTER
Spoke with Seng , per Dr. Rousseau,  It has been determined by your insurance the MRI of your brain is not a covered service. We will arrange and appointment with Dr Solis to discuss headaches.

## 2021-02-11 ENCOUNTER — TELEPHONE (OUTPATIENT)
Dept: FAMILY MEDICINE CLINIC | Facility: CLINIC | Age: 36
End: 2021-02-11

## 2021-02-11 NOTE — TELEPHONE ENCOUNTER
Caller: JULIÁN     Relationship to patient: [employer]     Best call back number: [phone]    REF NUMBER: [id number]    Patient is needing: PATIENTS INSURANCE IS CALLING IN REGARDS TO THE MRI REFERRAL THAT WAS PLACED IN FOR PATIENT. THEY WOULD LIKE TO KNOW WHAT DIAGNOSIS CODE MD PATRICK IS USING. PATIENT WOULD ALSO LIKE TO BE CONTACTED TO BE KEPT IN THE LOOP    PLEASE ADVISE

## 2021-02-12 NOTE — TELEPHONE ENCOUNTER
Spoke with Nina at this number , she has no  Information regarding this case. She did say if we have  denial and we have spoke with patient, we are finish with this case.  Denial and note to patient is in chart.

## 2021-02-18 ENCOUNTER — TELEPHONE (OUTPATIENT)
Dept: FAMILY MEDICINE CLINIC | Facility: CLINIC | Age: 36
End: 2021-02-18

## 2021-02-25 ENCOUNTER — TELEPHONE (OUTPATIENT)
Dept: FAMILY MEDICINE CLINIC | Facility: CLINIC | Age: 36
End: 2021-02-25

## 2021-02-25 NOTE — TELEPHONE ENCOUNTER
----- Message from Shahid Rousseau MD sent at 2/25/2021  9:59 AM EST -----  Good news, your MRI of your brain is normal, thanks

## 2021-04-12 DIAGNOSIS — F43.0 ACUTE REACTION TO STRESS: ICD-10-CM

## 2021-04-13 RX ORDER — VENLAFAXINE HYDROCHLORIDE 150 MG/1
150 CAPSULE, EXTENDED RELEASE ORAL DAILY
Qty: 300 CAPSULE | Refills: 2 | Status: SHIPPED | OUTPATIENT
Start: 2021-04-13 | End: 2022-01-31 | Stop reason: SDUPTHER

## 2021-06-27 DIAGNOSIS — F43.0 ACUTE REACTION TO STRESS: ICD-10-CM

## 2021-06-28 RX ORDER — LAMOTRIGINE 25 MG/1
TABLET ORAL
Qty: 90 TABLET | Refills: 1 | Status: SHIPPED | OUTPATIENT
Start: 2021-06-28 | End: 2021-12-27

## 2021-08-23 RX ORDER — IBUPROFEN 800 MG/1
TABLET ORAL
Qty: 90 TABLET | Refills: 0 | Status: SHIPPED | OUTPATIENT
Start: 2021-08-23 | End: 2022-01-31 | Stop reason: SDUPTHER

## 2021-11-29 NOTE — PROGRESS NOTES
Subjective   Seng Grimaldo is a 36 y.o. male.     Chief Complaint   Patient presents with   • Annual Exam       The patient is here: to discuss health maintenance and disease prevention to follow up on multiple medical problems.  Last comprehensive physical was on 1/34/2020.  Previous physical was performed by  Shahid Rousseau MD  Overall has: moderate activity with work/home activities. Nutrition: balanced diet. Last tetanus shot was unknown. Patient's last PSA was: 0.6 on 1/31/2020    History of Present Illness     Recent Hospitalizations:  No hospitalization(s) within the last year..  ccc      I personally reviewed and updated the patient's allergies, medications, problem list, and past medical, surgical, social, and family history. I have reviewed and confirmed the accuracy of the HPI and ROS as documented by the MA/LPN/RN Shahid Rousseau MD    Family History   Problem Relation Age of Onset   • Heart attack Mother 30   • Heart attack Father 44   • Diabetes Paternal Grandmother    • Heart disease Paternal Grandfather         Ischemic        Social History     Tobacco Use   • Smoking status: Never Smoker   • Smokeless tobacco: Current User     Types: Chew   • Tobacco comment: 1 can per day   Vaping Use   • Vaping Use: Never used   Substance Use Topics   • Alcohol use: Not Currently   • Drug use: Defer       Past Surgical History:   Procedure Laterality Date   • FRACTURE SURGERY Left     Crush injury, left leg, status post fasciotomy, no hardware in place       Patient Active Problem List   Diagnosis   • Limb pain   • Acute reaction to stress   • Tobacco use disorder   • Hyperthyroidism   • Annual visit for general adult medical examination with abnormal findings   • Benign prostatic hyperplasia with weak urinary stream   • Sleep disorder         Current Outpatient Medications:   •  ibuprofen (ADVIL,MOTRIN) 800 MG tablet, TAKE 1 TABLET BY MOUTH EVERY 6 HOURS AS NEEDED FOR PAIN, Disp: 90 tablet, Rfl: 0  •   "lamoTRIgine (LaMICtal) 25 MG tablet, TAKE 1 TABLET BY MOUTH EVERY DAY, Disp: 90 tablet, Rfl: 1  •  venlafaxine XR (EFFEXOR-XR) 150 MG 24 hr capsule, TAKE 1 CAPSULE BY MOUTH DAILY, Disp: 300 capsule, Rfl: 2    Review of Systems   Constitutional: Negative for chills and diaphoresis.   HENT: Negative for trouble swallowing and voice change.    Eyes: Negative for visual disturbance.   Respiratory: Negative for shortness of breath.    Cardiovascular: Negative for chest pain and palpitations.   Gastrointestinal: Negative for abdominal pain and nausea.   Endocrine: Negative for polydipsia and polyphagia.   Genitourinary: Negative for hematuria.   Musculoskeletal: Negative for neck stiffness.   Skin: Negative for color change and pallor.   Allergic/Immunologic: Negative for immunocompromised state.   Neurological: Negative for seizures and syncope.   Hematological: Negative for adenopathy.   Psychiatric/Behavioral: Negative for hallucinations, sleep disturbance and suicidal ideas.       I have reviewed and confirmed the accuracy of the ROS as documented by the MA/LPN/RN Shahid Rousseau MD      Objective   /90   Pulse 90   Temp 96.9 °F (36.1 °C)   Resp 18   Ht 182.9 cm (72\")   Wt 76.7 kg (169 lb 3.2 oz)   SpO2 99%   BMI 22.95 kg/m²   BP Readings from Last 3 Encounters:   11/30/21 130/90   01/20/21 140/80   12/28/20 119/77     Wt Readings from Last 3 Encounters:   11/30/21 76.7 kg (169 lb 3.2 oz)   01/20/21 75.3 kg (166 lb)   12/28/20 74.8 kg (164 lb 12.8 oz)     Physical Exam  Constitutional:       Appearance: He is well-developed. He is not diaphoretic.   HENT:      Head: Normocephalic.      Right Ear: Tympanic membrane, ear canal and external ear normal.      Left Ear: Tympanic membrane, ear canal and external ear normal.      Nose: Nose normal.   Eyes:      General: Lids are normal.      Conjunctiva/sclera: Conjunctivae normal.      Pupils: Pupils are equal, round, and reactive to light.   Neck:      Thyroid: " No thyromegaly.      Vascular: No carotid bruit or JVD.      Trachea: No tracheal deviation.   Cardiovascular:      Rate and Rhythm: Normal rate and regular rhythm.      Heart sounds: Normal heart sounds. No murmur heard.  No friction rub. No gallop.    Pulmonary:      Effort: Pulmonary effort is normal.      Breath sounds: Normal breath sounds. No stridor. No decreased breath sounds, wheezing or rales.   Abdominal:      General: Bowel sounds are normal. There is no distension.      Palpations: Abdomen is soft. There is no mass.      Tenderness: There is no abdominal tenderness. There is no guarding or rebound.      Hernia: No hernia is present.   Lymphadenopathy:      Head:      Right side of head: No submental, submandibular, tonsillar, preauricular, posterior auricular or occipital adenopathy.      Left side of head: No submental, submandibular, tonsillar, preauricular, posterior auricular or occipital adenopathy.      Cervical: No cervical adenopathy.   Skin:     General: Skin is warm and dry.      Coloration: Skin is not pale.   Neurological:      Mental Status: He is alert and oriented to person, place, and time.      Cranial Nerves: No cranial nerve deficit.      Sensory: No sensory deficit.      Coordination: Coordination normal.      Gait: Gait normal.      Deep Tendon Reflexes: Reflexes are normal and symmetric.         Data / Lab Results:    No results found for: HGBA1C     Lab Results   Component Value Date    LDL 80 01/31/2020    LDL 80 01/28/2019    LDL 72 01/18/2019     Lab Results   Component Value Date    CHOL 146 01/28/2019    CHOL 135 01/18/2019    CHOL 129 02/16/2018     Lab Results   Component Value Date    TRIG 92 01/31/2020    TRIG 96 01/28/2019    TRIG 93 01/18/2019     Lab Results   Component Value Date    HDL 44 01/31/2020    HDL 48 01/28/2019    HDL 44 01/18/2019     Lab Results   Component Value Date    PSA 0.6 01/31/2020     Lab Results   Component Value Date    WBC 3.8 01/31/2020    HGB  15.1 01/31/2020    HCT 45.8 01/31/2020    MCV 90 01/31/2020     01/31/2020     Lab Results   Component Value Date    TSH 0.173 (L) 01/31/2020    O2IBFOL 83 01/28/2019      Lab Results   Component Value Date    GLUCOSE 72 01/31/2020    BUN 10 01/31/2020    CREATININE 1.04 01/31/2020    EGFRIFNONA 93 01/31/2020    EGFRIFAFRI 107 01/31/2020    BCR 10 01/31/2020    K 4.6 01/31/2020    CO2 25 01/31/2020    CALCIUM 9.5 01/31/2020    PROTENTOTREF 6.5 01/31/2020    ALBUMIN 4.5 01/31/2020    LABIL2 2.3 (H) 01/31/2020    AST 13 01/31/2020    ALT 19 01/31/2020     No results found for: JANUARY, RF, SEDRATE   Lab Results   Component Value Date    CRP 0.8 02/16/2018      No results found for: IRON, TIBC, FERRITIN   No results found for: [id number]     Age-appropriate Screening Schedule:  Refer to the list below for future screening recommendations based on patient's age, sex and/or medical conditions. Orders for these recommended tests are listed in the plan section. The patient has been provided with a written plan.    Health Maintenance   Topic Date Due   • TDAP/TD VACCINES (1 - Tdap) Never done   • LIPID PANEL  01/31/2021   • INFLUENZA VACCINE  Never done           Assessment/Plan      Medications        Problem List         LOS      Physical.  Doing well.  Vaccines declined.  Start daily health maintenance, screening test, lifestyle modification.  Screening labs drawn.  Anxiety.  Much improved today, tolerating venlaxafine.  Not tolerating buspirone.    DDX includes bipolar,  much improved on Lamictal currently.  Recommend psychiatry referral he states he will consider.  Good social support.    Did not tolerate PRN mirtazapine.  Remote history of prescription narcotic abuse, resolved.  Follow-up recheck, follow-up visit scheduled in January 2022.  Headache.  Possible migraine.  Check MRI, he plans to call to reschedule it.  Follow-up recheck.  Consider Imitrex Rx.  Nocturnal shaking.  Possibly secondary to sleep-related  movement disorder.  Check EEG.  Follow-up recheck.  Consider neurology eval if persistent symptoms.  Subclinical hyperthyroidism.  History of, thyroid uptake scan normal 2013.  Thyroid levels have normalized, continue to monitor  Tobacco use.  chews.  Encourage cessation.  See dentist regularly.  BPH.  Has had urology eval in the past.  Stop chewing.  Check PSA.  Start Flomax.  Start PRN Levitra.  Follow-up recheck.  Call or return if worsening symptoms.  Recommend urology follow-up  Sleep disorder.  Did not tolerate mirtazapine.  Has seen [facility], sleep eval upcoming.      Diagnoses and all orders for this visit:    1. Annual visit for general adult medical examination with abnormal findings (Primary)    2. Tobacco use disorder    3. Hyperthyroidism  -     TSH  -     T4, Free    4. Screening for hyperlipidemia  -     Lipid Panel With / Chol / HDL Ratio    5. Malaise and fatigue  -     CBC & Differential  -     Comprehensive Metabolic Panel  -     TSH              Expected course, medications, and adverse effects discussed.  Call or return if worsening or persistent symptoms.  I wore protective equipment throughout this patient encounter including a mask, gloves, and eye protection.  Hand hygiene was performed before donning protective equipment and after removal when leaving the room. The complete contents of the Assessment and Plan and Data / Lab Results as documented above have been reviewed and addressed by myself with the patient today as part of an ongoing evaluation / treatment plan.  If some of the documentation has been copied from a previous note and is unchanged it indicates that this problem / plan has been assessed today but is stable from a previous visit and no changes have been recommended.

## 2021-11-30 ENCOUNTER — OFFICE VISIT (OUTPATIENT)
Dept: FAMILY MEDICINE CLINIC | Facility: CLINIC | Age: 36
End: 2021-11-30

## 2021-11-30 VITALS
DIASTOLIC BLOOD PRESSURE: 90 MMHG | HEIGHT: 72 IN | SYSTOLIC BLOOD PRESSURE: 130 MMHG | RESPIRATION RATE: 18 BRPM | BODY MASS INDEX: 22.92 KG/M2 | HEART RATE: 90 BPM | OXYGEN SATURATION: 99 % | TEMPERATURE: 96.9 F | WEIGHT: 169.2 LBS

## 2021-11-30 DIAGNOSIS — Z00.01 ANNUAL VISIT FOR GENERAL ADULT MEDICAL EXAMINATION WITH ABNORMAL FINDINGS: Primary | ICD-10-CM

## 2021-11-30 DIAGNOSIS — R53.81 MALAISE AND FATIGUE: ICD-10-CM

## 2021-11-30 DIAGNOSIS — Z13.220 SCREENING FOR HYPERLIPIDEMIA: ICD-10-CM

## 2021-11-30 DIAGNOSIS — R53.83 MALAISE AND FATIGUE: ICD-10-CM

## 2021-11-30 DIAGNOSIS — F17.200 TOBACCO USE DISORDER: ICD-10-CM

## 2021-11-30 DIAGNOSIS — E05.90 HYPERTHYROIDISM: ICD-10-CM

## 2021-11-30 PROCEDURE — 99395 PREV VISIT EST AGE 18-39: CPT | Performed by: FAMILY MEDICINE

## 2021-11-30 RX ORDER — CYCLOBENZAPRINE HCL 10 MG
10 TABLET ORAL
COMMUNITY
Start: 2021-07-09 | End: 2021-11-30

## 2021-12-04 LAB
ALBUMIN SERPL-MCNC: 4.6 G/DL (ref 4–5)
ALBUMIN/GLOB SERPL: 2 {RATIO} (ref 1.2–2.2)
ALP SERPL-CCNC: 79 IU/L (ref 44–121)
ALT SERPL-CCNC: 17 IU/L (ref 0–44)
AST SERPL-CCNC: 15 IU/L (ref 0–40)
BASOPHILS # BLD AUTO: 0 X10E3/UL (ref 0–0.2)
BASOPHILS NFR BLD AUTO: 0 %
BILIRUB SERPL-MCNC: 0.3 MG/DL (ref 0–1.2)
BUN SERPL-MCNC: 8 MG/DL (ref 6–20)
BUN/CREAT SERPL: 8 (ref 9–20)
CALCIUM SERPL-MCNC: 9.8 MG/DL (ref 8.7–10.2)
CHLORIDE SERPL-SCNC: 101 MMOL/L (ref 96–106)
CHOLEST SERPL-MCNC: 156 MG/DL (ref 100–199)
CHOLEST/HDLC SERPL: 3 RATIO (ref 0–5)
CO2 SERPL-SCNC: 26 MMOL/L (ref 20–29)
CREAT SERPL-MCNC: 0.97 MG/DL (ref 0.76–1.27)
EOSINOPHIL # BLD AUTO: 0.1 X10E3/UL (ref 0–0.4)
EOSINOPHIL NFR BLD AUTO: 1 %
ERYTHROCYTE [DISTWIDTH] IN BLOOD BY AUTOMATED COUNT: 12.3 % (ref 11.6–15.4)
GLOBULIN SER CALC-MCNC: 2.3 G/DL (ref 1.5–4.5)
GLUCOSE SERPL-MCNC: 79 MG/DL (ref 65–99)
HCT VFR BLD AUTO: 45.5 % (ref 37.5–51)
HDLC SERPL-MCNC: 52 MG/DL
HGB BLD-MCNC: 15.4 G/DL (ref 13–17.7)
IMM GRANULOCYTES # BLD AUTO: 0 X10E3/UL (ref 0–0.1)
IMM GRANULOCYTES NFR BLD AUTO: 0 %
LDLC SERPL CALC-MCNC: 84 MG/DL (ref 0–99)
LYMPHOCYTES # BLD AUTO: 1.4 X10E3/UL (ref 0.7–3.1)
LYMPHOCYTES NFR BLD AUTO: 28 %
MCH RBC QN AUTO: 30 PG (ref 26.6–33)
MCHC RBC AUTO-ENTMCNC: 33.8 G/DL (ref 31.5–35.7)
MCV RBC AUTO: 89 FL (ref 79–97)
MONOCYTES # BLD AUTO: 0.5 X10E3/UL (ref 0.1–0.9)
MONOCYTES NFR BLD AUTO: 11 %
NEUTROPHILS # BLD AUTO: 2.9 X10E3/UL (ref 1.4–7)
NEUTROPHILS NFR BLD AUTO: 60 %
PLATELET # BLD AUTO: 287 X10E3/UL (ref 150–450)
POTASSIUM SERPL-SCNC: 4.4 MMOL/L (ref 3.5–5.2)
PROT SERPL-MCNC: 6.9 G/DL (ref 6–8.5)
RBC # BLD AUTO: 5.14 X10E6/UL (ref 4.14–5.8)
SODIUM SERPL-SCNC: 142 MMOL/L (ref 134–144)
T4 FREE SERPL-MCNC: 1.31 NG/DL (ref 0.82–1.77)
TRIGL SERPL-MCNC: 113 MG/DL (ref 0–149)
TSH SERPL DL<=0.005 MIU/L-ACNC: 0.4 UIU/ML (ref 0.45–4.5)
VLDLC SERPL CALC-MCNC: 20 MG/DL (ref 5–40)
WBC # BLD AUTO: 4.8 X10E3/UL (ref 3.4–10.8)

## 2021-12-25 DIAGNOSIS — F43.0 ACUTE REACTION TO STRESS: ICD-10-CM

## 2021-12-27 RX ORDER — LAMOTRIGINE 25 MG/1
TABLET ORAL
Qty: 90 TABLET | Refills: 1 | Status: SHIPPED | OUTPATIENT
Start: 2021-12-27 | End: 2022-01-31 | Stop reason: SDUPTHER

## 2022-01-31 ENCOUNTER — TELEMEDICINE (OUTPATIENT)
Dept: FAMILY MEDICINE CLINIC | Facility: CLINIC | Age: 37
End: 2022-01-31

## 2022-01-31 VITALS — BODY MASS INDEX: 22.89 KG/M2 | HEIGHT: 72 IN | WEIGHT: 169 LBS | RESPIRATION RATE: 18 BRPM | TEMPERATURE: 98.1 F

## 2022-01-31 DIAGNOSIS — U07.1 COVID-19 VIRUS DETECTED: ICD-10-CM

## 2022-01-31 DIAGNOSIS — M79.604 PAIN IN BOTH LOWER EXTREMITIES: ICD-10-CM

## 2022-01-31 DIAGNOSIS — F17.200 TOBACCO USE DISORDER: ICD-10-CM

## 2022-01-31 DIAGNOSIS — G47.9 SLEEP DISORDER: ICD-10-CM

## 2022-01-31 DIAGNOSIS — F43.0 ACUTE REACTION TO STRESS: Primary | ICD-10-CM

## 2022-01-31 DIAGNOSIS — J32.9 SINUSITIS, UNSPECIFIED CHRONICITY, UNSPECIFIED LOCATION: ICD-10-CM

## 2022-01-31 DIAGNOSIS — M79.605 PAIN IN BOTH LOWER EXTREMITIES: ICD-10-CM

## 2022-01-31 PROCEDURE — 99214 OFFICE O/P EST MOD 30 MIN: CPT | Performed by: FAMILY MEDICINE

## 2022-01-31 RX ORDER — IBUPROFEN 800 MG/1
800 TABLET ORAL 3 TIMES DAILY PRN
Qty: 270 TABLET | Refills: 3 | Status: SHIPPED | OUTPATIENT
Start: 2022-01-31 | End: 2022-10-24

## 2022-01-31 RX ORDER — PREDNISONE 1 MG/1
TABLET ORAL
Qty: 45 TABLET | Refills: 0 | Status: SHIPPED | OUTPATIENT
Start: 2022-01-31 | End: 2022-06-01

## 2022-01-31 RX ORDER — AZITHROMYCIN 250 MG/1
TABLET, FILM COATED ORAL
Qty: 6 TABLET | Refills: 0 | Status: SHIPPED | OUTPATIENT
Start: 2022-01-31 | End: 2022-06-01

## 2022-01-31 RX ORDER — LAMOTRIGINE 25 MG/1
25 TABLET ORAL DAILY
Qty: 90 TABLET | Refills: 1 | Status: SHIPPED | OUTPATIENT
Start: 2022-01-31 | End: 2022-06-01

## 2022-01-31 RX ORDER — VENLAFAXINE HYDROCHLORIDE 150 MG/1
150 CAPSULE, EXTENDED RELEASE ORAL DAILY
Qty: 90 CAPSULE | Refills: 3 | Status: SHIPPED | OUTPATIENT
Start: 2022-01-31 | End: 2022-12-06 | Stop reason: SDUPTHER

## 2022-02-04 PROBLEM — U07.1 COVID-19 VIRUS DETECTED: Status: ACTIVE | Noted: 2022-02-04

## 2022-06-01 ENCOUNTER — OFFICE VISIT (OUTPATIENT)
Dept: PAIN MEDICINE | Facility: CLINIC | Age: 37
End: 2022-06-01

## 2022-06-01 VITALS
OXYGEN SATURATION: 99 % | BODY MASS INDEX: 23.03 KG/M2 | HEIGHT: 72 IN | HEART RATE: 74 BPM | DIASTOLIC BLOOD PRESSURE: 81 MMHG | SYSTOLIC BLOOD PRESSURE: 120 MMHG | WEIGHT: 170 LBS

## 2022-06-01 DIAGNOSIS — M95.8 WINGED SCAPULA OF RIGHT SIDE: Primary | ICD-10-CM

## 2022-06-01 PROCEDURE — 99204 OFFICE O/P NEW MOD 45 MIN: CPT | Performed by: STUDENT IN AN ORGANIZED HEALTH CARE EDUCATION/TRAINING PROGRAM

## 2022-06-01 RX ORDER — AMITRIPTYLINE HYDROCHLORIDE 25 MG/1
25 TABLET, FILM COATED ORAL NIGHTLY
Qty: 30 TABLET | Refills: 0 | Status: SHIPPED | OUTPATIENT
Start: 2022-06-01 | End: 2022-08-08

## 2022-06-01 NOTE — PROGRESS NOTES
CHIEF COMPLAINT  Chief Complaint   Patient presents with   • Neck Pain   • Shoulder Pain     Rt shoulder blade-- radiates to rt side of head       Primary Care  Shahid Rousseau MD    Subjective   Seng LOPEZ Dillan is a 36 y.o. male  who presents for right chest wall and right scapular pain.  He states that approximate 1 year ago, he was working using a large wrench and felt a popping sensation in the right scapula and right chest.  He states that after that time, he experiencing difficult pain and was later found to have winging of his scapula.  He was evaluated with an MRI but no abnormalities were found at that time.  He has been working with an outside physician with rehabilitation and dry needling but up to this point has only got limited benefit.  He was referred here for possible evaluation of intercostal nerve blocks as the other physician does not do interventional techniques.    History of Present Illness     Location: Right chest wall, right scapular  Onset: 1 year ago  Duration: Fairly unchanged  Timing: Constant throughout the day  Quality: Aching, stabbing  Severity: Today: 7       Last Week: 7       Worst: 9  Modifying Factors: The pain is worse with movement of the right arm and right shoulder movements  Functional Deficit: The pain makes it difficult for him to work and use his right arm    Physical Therapy: yes    Interval Update 06/01/2022:     The following portions of the patient's history were reviewed and updated as appropriate: allergies, current medications, past family history, past medical history, past social history, past surgical history and problem list.      Current Outpatient Medications:   •  ibuprofen (ADVIL,MOTRIN) 800 MG tablet, Take 1 tablet by mouth 3 (Three) Times a Day As Needed for Moderate Pain . for pain, Disp: 270 tablet, Rfl: 3  •  venlafaxine XR (EFFEXOR-XR) 150 MG 24 hr capsule, Take 1 capsule by mouth Daily., Disp: 90 capsule, Rfl: 3  •  amitriptyline (ELAVIL) 25 MG  "tablet, Take 1 tablet by mouth Every Night., Disp: 30 tablet, Rfl: 0    Review of Systems   Musculoskeletal: Positive for arthralgias, back pain, myalgias and neck pain. Negative for gait problem.   Neurological: Negative for weakness and numbness.       Vitals:    06/01/22 1548   BP: 120/81   Pulse: 74   SpO2: 99%   Weight: 77.1 kg (170 lb)   Height: 182.9 cm (72\")   PainSc:   7       Objective   Physical Exam  Vitals and nursing note reviewed. Exam conducted with a chaperone present.   Constitutional:       General: He is not in acute distress.     Appearance: Normal appearance. He is normal weight.   Musculoskeletal:      Thoracic back: Swelling and deformity present. No signs of trauma. Decreased range of motion.        Back:       Comments: Tenderness to palpation across the thoracic spine and just below the scapula.  He has palpable muscle spasm and muscle deformity across the right lateral chest wall.  Tender palpation across the intercostal muscles as well.  Winging of the scapula present With arm extension   Neurological:      Mental Status: He is alert.           Assessment & Plan   Problems Addressed this Visit    None     Visit Diagnoses     Winged scapula of right side    -  Primary    Relevant Orders    MRI Chest & Spine Without Contrast      Diagnoses       Codes Comments    Winged scapula of right side    -  Primary ICD-10-CM: M95.8  ICD-9-CM: 736.89           Plan:  1. We will plan for repeat MRI of the right chest wall and right thoracic area to try to better assess the abnormality seen  2. At this point, I do not have a specific cause for his scapular winging however I am somewhat concerned he may have a component of muscle injury or muscle tear as I do feel a palpable mass on the right chest wall which would coincide with the serratus anterior dysfunction  3. May consider intercostal blocks and/or referral to thoracic surgery pending the MRI  4. Can also try amitriptyline.  He has a history of " opioid misuse in the past and does not want to remain on any addictive narcotics  --- Follow-up 1 month           INSPECT REPORT    As part of the patient's treatment plan, I may be prescribing controlled substances. The patient has been made aware of appropriate use of such medications, including potential risk of somnolence, limited ability to drive and/or work safely, and the potential for dependence or overdose. It has also bee made clear that these medications are for use by this patient only, without concomitant use of alcohol or other substances unless prescribed.     Patient has completed prescribing agreement detailing terms of continued prescribing of controlled substances, including monitoring DANITZA reports, urine drug screening, and pill counts if necessary. The patient is aware that inappropriate use will results in cessation of prescribing such medications.    INSPECT report has been reviewed and scanned into the patient's chart.    As the clinician, I personally reviewed the INSPECT from 5/31/2022.    History and physical exam exhibit continued safe and appropriate use of controlled substances.      EMR Dragon/Transcription disclaimer:   Much of this encounter note is an electronic transcription/translation of spoken language to printed text. The electronic translation of spoken language may permit erroneous, or at times, nonsensical words or phrases to be inadvertently transcribed; Although I have reviewed the note for such errors, some may still exist.

## 2022-06-02 ENCOUNTER — TELEPHONE (OUTPATIENT)
Dept: PAIN MEDICINE | Facility: CLINIC | Age: 37
End: 2022-06-02

## 2022-06-02 NOTE — TELEPHONE ENCOUNTER
HUB STAFF ATTEMPTED CLINICAL WARM TRANSFER - NO ANSWER     Caller: TABITHA LONG - [facility]     Best call back number: [phone]    What orders are you requesting (i.e. lab or imaging):   CLARIFICATION IF DR BUTTS WANTS BOTH A CHEST MRI TO VIEW MASS NOTED AND A SEPARATE THORACIC MRI?     OR JUST ONE OR THE OTHER?     In what timeframe would the patient need to come in:   CURRENTLY SCHEDULED NEXT FRI 06-10-22     Where will you receive your lab/imaging services:   [facility] [place]     Additional notes:   THORACIC MRI (ONLY ORDER CURRENTLY SENT) NOTES TO HAVE [facility] OBTAIN AUTH - HOWEVER ALSO NOTES FROM A WORK COMP INJURY SO THE AUTH (OR SEPARATE WC AUTHS IF GOING TO ORDER A SEPARATE CHEST MRI) WOULD NEED TO BE OBTAINED BY [facility]     PLEASE CALL (NO VMAIL - CAN ASK FOR TABITHA OR FELIZ) TO DISCUSS WHICH MRI/s TO BE DONE & AUTH/S NEEDED     THANKS

## 2022-06-02 NOTE — TELEPHONE ENCOUNTER
6/2/22- [facility] needs order resent by Dr Ambrose  stating MRI of Chest without-- we need to ck with Workmans comp on authorization for this radiology exam

## 2022-06-02 NOTE — TELEPHONE ENCOUNTER
6/2/22 -Spoke with pts wife-- Info on Workmans Comp--  [employer]-- Claim # [id number]--  Suzette Dias-- #[phone]-- left vm for -- trying to get MRI of Chest with out , approved --that was ordered  by Dr Ambrose -thru workmans comp /need authorization for [facility] to do MRI-- waiting on  to call us back -- left our phone #

## 2022-06-02 NOTE — TELEPHONE ENCOUNTER
This patient has a winged scapula.  I was specifically looking to assess the soft tissue around the area of the right scapula to assess for any soft tissue or muscle injury that could explain his issue.  He has reportedly had EMG testing done already tat didn't reveal any abnormalities.

## 2022-06-02 NOTE — TELEPHONE ENCOUNTER
6/2/22- Dr Ambrose-- Please  Review message from [facility]( Orders and additional  notes from [facility])and advise and send clarity for the specific type of xrays needed( Office notes faxed today 6/2/22)

## 2022-06-03 DIAGNOSIS — M95.8 WINGED SCAPULA OF RIGHT SIDE: Primary | ICD-10-CM

## 2022-06-06 ENCOUNTER — TELEPHONE (OUTPATIENT)
Dept: PAIN MEDICINE | Facility: CLINIC | Age: 37
End: 2022-06-06

## 2022-06-06 NOTE — TELEPHONE ENCOUNTER
Caller: JOSTIN ROCA    Relationship: WIFE    Best call back number: [phone]    What was the call regarding: NEED A COPY OF THE MRI ORDER FOR THEIR WORK COMP  FOR THEM TO APPROVE. PLEASE FAX [phone].    Do you require a callback: YES

## 2022-06-13 ENCOUNTER — TELEPHONE (OUTPATIENT)
Dept: PAIN MEDICINE | Facility: CLINIC | Age: 37
End: 2022-06-13

## 2022-06-14 NOTE — TELEPHONE ENCOUNTER
6/14/22-- Dr Ambrose-- spoke to pt and told of MRI results---  Pt wanted to know if you where scheduling him for a nerve block-- has ov fu on 6/29 - please  advise  is procedure needs to be scheduled

## 2022-06-15 ENCOUNTER — TELEPHONE (OUTPATIENT)
Dept: PAIN MEDICINE | Facility: CLINIC | Age: 37
End: 2022-06-15

## 2022-06-15 NOTE — TELEPHONE ENCOUNTER
PATIENT RETURNED CALL TO SCHEDULE NERVE BLOCKS - WAS ADVISED SOMEONE WOULD CALL THEM BACK - REQUESTED TO CALL THIS NUMBER  (JOSTIN - SPOUSE).

## 2022-06-15 NOTE — TELEPHONE ENCOUNTER
Caller: HA  Relationship to Patient: SELF    Phone Number: [phone]  Reason for Call: PT CALLED STATING THAT HE NEEDS TO GET AN EPIDURAL SET UP WITH DR BUTTS. PLEASE ADVISE

## 2022-06-17 ENCOUNTER — TELEPHONE (OUTPATIENT)
Dept: FAMILY MEDICINE CLINIC | Facility: CLINIC | Age: 37
End: 2022-06-17

## 2022-06-17 NOTE — TELEPHONE ENCOUNTER
Caller: Seng Grimaldo    Relationship: Self    Best call back number:  [phone]       What orders are you requesting (i.e. lab or imaging): HAIR FOLLICLE DRUG SCREENING     In what timeframe would the patient need to come in: ASAP    Where will you receive your lab/imaging services: IN OFFICE     Additional notes: PATIENT IS WANTING THIS SCREENING DONE TODAY, 6/17/22, IF POSSIBLE.     PATIENT IS REQUESTING A CALL BACK

## 2022-06-17 NOTE — TELEPHONE ENCOUNTER
----- Message from Seng Grimaldo sent at 6/16/2022  6:56 PM EDT -----  Regarding: Hair follicle drug screen   [facility] requires a physicians order to have a hair follicle drug screen done. I would like to have a hair follicle drug screen done for the following: opiates, oxycodone, Neurontin, amphetamines, benzos and tramadol. (If there is a typical 12 panel that is fine but need those drugs listed ruled out)Can you please send an order downstairs to them so that I can have this done?

## 2022-06-20 NOTE — TELEPHONE ENCOUNTER
Spoke with Wife and let her know that we can order a pnl 16 that this is typically done with whole blood however it does give me the option to change to hair. I told her in order to order this I need a diagnosis but it would be attached to his chart. She asked to call me back later and if she doesn't call just to disregard the message

## 2022-06-29 ENCOUNTER — HOSPITAL ENCOUNTER (OUTPATIENT)
Dept: PAIN MEDICINE | Facility: HOSPITAL | Age: 37
Discharge: HOME | End: 2022-06-29

## 2022-06-29 VITALS
HEIGHT: 72 IN | HEART RATE: 62 BPM | BODY MASS INDEX: 23.03 KG/M2 | DIASTOLIC BLOOD PRESSURE: 84 MMHG | TEMPERATURE: 97.8 F | WEIGHT: 170 LBS | OXYGEN SATURATION: 100 % | RESPIRATION RATE: 18 BRPM | SYSTOLIC BLOOD PRESSURE: 127 MMHG

## 2022-06-29 DIAGNOSIS — M95.8 WINGED SCAPULA OF RIGHT SIDE: Primary | ICD-10-CM

## 2022-06-29 DIAGNOSIS — R52 PAIN: ICD-10-CM

## 2022-06-29 PROCEDURE — 25010000002 DEXAMETHASONE SODIUM PHOSPHATE 10 MG/ML SOLUTION: Performed by: STUDENT IN AN ORGANIZED HEALTH CARE EDUCATION/TRAINING PROGRAM

## 2022-06-29 PROCEDURE — 64420 NJX AA&/STRD NTRCOST NRV 1: CPT | Performed by: STUDENT IN AN ORGANIZED HEALTH CARE EDUCATION/TRAINING PROGRAM

## 2022-06-29 PROCEDURE — 77003 FLUOROGUIDE FOR SPINE INJECT: CPT

## 2022-06-29 PROCEDURE — 0 IOPAMIDOL 41 % SOLUTION: Performed by: STUDENT IN AN ORGANIZED HEALTH CARE EDUCATION/TRAINING PROGRAM

## 2022-06-29 PROCEDURE — 64421 NJX AA&/STRD NTRCOST NRV EA: CPT | Performed by: STUDENT IN AN ORGANIZED HEALTH CARE EDUCATION/TRAINING PROGRAM

## 2022-06-29 RX ORDER — CLONIDINE 100 UG/ML
INJECTION, SOLUTION EPIDURAL
Status: DISCONTINUED
Start: 2022-06-29 | End: 2022-06-29 | Stop reason: WASHOUT

## 2022-06-29 RX ORDER — DEXAMETHASONE SODIUM PHOSPHATE 10 MG/ML
10 INJECTION, SOLUTION INTRAMUSCULAR; INTRAVENOUS ONCE
Status: COMPLETED | OUTPATIENT
Start: 2022-06-29 | End: 2022-06-29

## 2022-06-29 RX ADMIN — DEXAMETHASONE SODIUM PHOSPHATE 10 MG: 10 INJECTION, SOLUTION INTRAMUSCULAR; INTRAVENOUS at 09:42

## 2022-06-29 RX ADMIN — IOPAMIDOL 3 ML: 408 INJECTION, SOLUTION INTRATHECAL at 09:41

## 2022-06-29 NOTE — PROCEDURES
Intercostal Nerve Block (unilateral, multi-level)  [facility]    PREOPERATIVE DIAGNOSIS:  Intercostal neuritis    POSTOPERATIVE DIAGNOSIS:   Same as preop    PROCEDURE:    • CPT 00297  -- Diagnostic Intercostal Nerve Blocks on the right at the T8, T9 and T10 Thoracic Rib Levels,  under fluoroscopic guidance  • 05985 - Fluoroscopic Guidance for ICNB needle placement    PRE-PROCEDURE DISCUSSION WITH PATIENT:    Risks and complications were discussed with the patient prior to starting the procedure and informed consent was obtained.   We discussed various topics including but not limited to bleeding, infection, injury, nerve injury, coma, paralysis, death, pneumothorax, local anesthetic toxicity, worsening of clinical picture, postprocedural soreness, and a lack of pain relief.      SURGEON:  Victor Hugo Ambrose MD    REASON FOR PROCEDURE:    Intercostal neuritis, chronic chest pain    SEDATION:  Patient declined administration of moderate sedation    ANESTHETIC:  Lidocaine 1%  STEROID:  Methylprednisolone (DEPO MEDROL) 40mg/ml  TOTAL VOLUME OF SOLUTION:  3 mL    DESCRIPTON OF PROCEDURE:  After obtaining informed consent, an I.V. was not started in the preoperative area. The patient taken to the operating room and was placed in the prone position with a pillow under the abdomen.  All pressure points were well padded. The area was prepped with Chloraprep and draped in a sterile fashion.    The first of the above mentioned ribs on the aforementioned side was identified by palpating its path at the posterior axillary line.  The rib was confirmed with fluoroscopic guidance.  The rib was held between my index and middle finger to bracket the site of needle insertion.   This site was anesthetized with subcutaneous local anesthetic.  Using sterile technique, a 25 gauge needle was inserted and advanced perpendicular to the skin aiming for the middle of the rib, between my index and middle finger.  Serial fluoroscopic  images were checked to confirm the tip of the needle was overlying the rib throughout this track toward contact.  After bony contact was made, the needle was withdrawn a few millimeters and redirected inferiorly to walk off the caudad aspect of the rib.  As soon as bony contact was lost, the needle was slowly advanced 2 mm deeper.  Aspiration was negative for blood, CSF, or air.  After negative aspiration, 1mL of Omnipaque was injected and proper spread without vascular uptake was seen under the rib.   Next, an injectate of 2mL of the local anesthetic solution with the mentioned corticosteroid was injected to perform the intercostal nerve block.     In similar manner, the other levels were addressed to perform ICNB at the remaining levels.  Vital signs were stable throughout.      ESTIMATED BLOOD LOSS:  none  SPECIMENS:  none    COMPLICATIONS:  No complications were noted. and There was no indication of vascular uptake on live injection of contrast dye.    TOLERANCE & DISCHARGE CONDITION:    The patient tolerated the procedure well.  The patient was transported to the recovery area without difficulties.  The patient was discharged to home under the care of family in stable and satisfactory condition.    PLAN OF CARE:  1. The patient was given our standard instruction sheet.  2. The patient will Return to clinic 4-6 wks.    3. The patient is asked to keep a pain log hourly for 8 hours today.  4. The patient will resume all medications as per the medication reconciliation sheet.

## 2022-06-29 NOTE — DISCHARGE INSTRUCTIONS
Intercostal Nerve Block    An intercostal nerve block is a procedure to block pain in your chest or rib. During this procedure, your health care provider will inject a numbing medicine (local anesthetic) into the space between your ribs where there are nerves called intercostal nerves. These nerves control feeling in your chest and rib area. In some cases, more than one injection may be done if a wide area needs to be blocked. You may need this procedure to:  Numb the area during surgery of the chest wall, such as surgery on your breast or gallbladder. In these cases, the nerve block may be combined with other anesthesia.  Reduce pain after chest surgery.  Block pain during placement of a chest tube.  Reduce pain after a chest injury, such as a broken rib.  Reduce cancer pain.  Relieve chronic chest pain.  Relieve pain from a shingles infection.  If the procedure is being done to treat long-term pain, a long-lasting anti-inflammatory medicine (steroid) may also be injected along with the numbing medicine.    Tell a health care provider about:  Any allergies you have.  All medicines you are taking, including vitamins, herbs, eye drops, creams, and over-the-counter medicines.  Any problems you or family members have had with anesthetic medicines.  Any blood disorders you have.  Any surgeries you have had.  Any medical conditions you have.  Whether you are pregnant or may be pregnant.    What are the risks?  Generally, this is a safe procedure. However, problems may occur, including:  Infection.  Bleeding.  Allergic reactions to medicines.  Damage to other structures, such as a nerve or blood vessel that runs close to an intercostal nerve.  Damage to a lung, causing a lung collapse.    Medicines  Ask your health care provider about:  Changing or stopping your regular medicines. This is especially important if you are taking diabetes medicines or blood thinners.  Taking over-the-counter medicines, vitamins, herbs, and  supplements.    General instructions  Ask your health care provider what steps will be taken to help prevent infection. These steps may include:  Removing hair at the injection site.  Washing skin with a germ-killing soap.      What happens during the procedure?  Your health care provider will inject a local anesthetic to numb your skin in the area where the needle will be inserted.  Your health care provider will then:  Place a needle through the numbed area on your chest between your ribs.  Use X-ray or ultrasound imaging to guide the needle into the right place.  Inject the anesthetic or the anesthetic and steroid into the area near an intercostal nerve.  Remove the needle.  If more than one block is needed, the procedure will be repeated between other ribs.    The procedure may vary among health care providers and hospitals.    What can I expect after the procedure?  Your blood pressure, heart rate, breathing rate, and blood oxygen level will be monitored until you leave the hospital or clinic.  You may feel less pain right away.  If you are being treated for long-term pain, you may expect some pain to return when the anesthetic medicine wears off. After a few days, you should start to feel pain relief from the steroid medicine.    Follow these instructions at home:         Take over-the-counter and prescription medicines only as told by your health care provider.    Return to your normal activities the day after your procedure.  Check your injection site every day for signs of infection. Check for:  Redness, swelling, or pain.  Fluid or blood.  Warmth.  Pus or a bad smell.      4.  Some numbness may be experienced for several hours following the procedure.      5.  Avoid using heat on the injection site for 24 hours. You may use ice intermittently if needed by placing a            towel between your skin and the ice bag and using the ice for 20 minutes 2-3 times a day.      6.  Do not take baths, swim or use a  hot tub for 24 hours.      7.  You may remove the band-aids after 24 hours.      8.   Keep all follow-up visits. This is important.    Contact your health care provider if:  You have a fever or chills.  You have any signs of infection at your injection site.  You have a cough or shortness of breath.  You do not get any relief from your pain.    Summary  An intercostal nerve block is a procedure to block pain in your chest or rib.  You may need this procedure to block pain during a procedure or to block long-term pain from a chronic condition.  Your health care provider will inject a numbing medicine (local anesthetic) into the space between your ribs where there are nerves called intercostal nerves.  If long-term pain is being treated, a long-lasting anti-inflammatory medicine (steroid) may also be injected along with the numbing medicine.  If you are being treated for long-term pain, some pain may return when the anesthetic medicine wears off. It may take a few days to feel pain relief from the steroid medicine.    This information is not intended to replace advice given to you by your health care provider. Make sure you discuss any questions you have with your health care provider.    Document Revised: 04/09/2021 Document Reviewed: 04/09/2021  Elsevier Patient Education © 2021 [employer]

## 2022-06-30 ENCOUNTER — TELEPHONE (OUTPATIENT)
Dept: PAIN MEDICINE | Facility: HOSPITAL | Age: 37
End: 2022-06-30

## 2022-06-30 NOTE — TELEPHONE ENCOUNTER
Attempted post procedure phone call but no answer.  Message left on answering machine to call us with any questions or concerns.

## 2022-08-08 ENCOUNTER — OFFICE VISIT (OUTPATIENT)
Dept: PAIN MEDICINE | Facility: CLINIC | Age: 37
End: 2022-08-08

## 2022-08-08 VITALS
DIASTOLIC BLOOD PRESSURE: 78 MMHG | OXYGEN SATURATION: 99 % | HEART RATE: 68 BPM | WEIGHT: 170 LBS | RESPIRATION RATE: 16 BRPM | BODY MASS INDEX: 23.06 KG/M2 | SYSTOLIC BLOOD PRESSURE: 120 MMHG

## 2022-08-08 DIAGNOSIS — M95.8 WINGED SCAPULA OF RIGHT SIDE: Primary | ICD-10-CM

## 2022-08-08 PROCEDURE — 99214 OFFICE O/P EST MOD 30 MIN: CPT | Performed by: STUDENT IN AN ORGANIZED HEALTH CARE EDUCATION/TRAINING PROGRAM

## 2022-08-08 RX ORDER — TIZANIDINE 2 MG/1
2 TABLET ORAL 2 TIMES DAILY PRN
Qty: 60 TABLET | Refills: 0 | Status: SHIPPED | OUTPATIENT
Start: 2022-08-08 | End: 2022-10-31

## 2022-08-08 NOTE — PROGRESS NOTES
CHIEF COMPLAINT  Chief Complaint   Patient presents with   • Shoulder Pain     Winged scapula of right side, 4-6 week f/u right intercostal nerve block No Narcotics        Primary Care  Shahid Rousseau MD    Subjective   Seng LOPEZ Dillan is a 36 y.o. male  who presents for right chest wall and right scapular pain.  He states that approximate 1 year ago, he was working using a large wrench and felt a popping sensation in the right scapula and right chest.  He states that after that time, he experiencing difficult pain and was later found to have winging of his scapula.  He was evaluated with an MRI but no abnormalities were found at that time.  He has been working with an outside physician with rehabilitation and dry needling but up to this point has only got limited benefit.  He was referred here for possible evaluation of intercostal nerve blocks as the other physician does not do interventional techniques.    History of Present Illness     Location: Right chest wall, right scapular  Onset: 1 year ago  Duration: Fairly unchanged  Timing: Constant throughout the day  Quality: Aching, stabbing  Severity: Today: 7       Last Week: 7       Worst: 9  Modifying Factors: The pain is worse with movement of the right arm and right shoulder movements  Functional Deficit: The pain makes it difficult for him to work and use his right arm    Physical Therapy: yes    Interval Update 08/08/2022: Somewhat unsure whether or not he got significant benefit from the intercostal nerve blocks.  He thinks he may have gotten several days of 50% benefit.  He continues to follow-up with orthopedic surgery.  He is scheduled to undergo further EMG testing to isolate the long thoracic nerve and evaluate for conduction delays.    The following portions of the patient's history were reviewed and updated as appropriate: allergies, current medications, past family history, past medical history, past social history, past surgical history and problem  list.      Current Outpatient Medications:   •  ibuprofen (ADVIL,MOTRIN) 800 MG tablet, Take 1 tablet by mouth 3 (Three) Times a Day As Needed for Moderate Pain . for pain, Disp: 270 tablet, Rfl: 3  •  venlafaxine XR (EFFEXOR-XR) 150 MG 24 hr capsule, Take 1 capsule by mouth Daily., Disp: 90 capsule, Rfl: 3  •  tiZANidine (ZANAFLEX) 2 MG tablet, Take 1 tablet by mouth 2 (Two) Times a Day As Needed for Muscle Spasms., Disp: 60 tablet, Rfl: 0    Review of Systems   Musculoskeletal: Positive for arthralgias, back pain, myalgias and neck pain. Negative for gait problem.   Neurological: Negative for weakness and numbness.       Vitals:    08/08/22 1053   BP: 120/78   Pulse: 68   Resp: 16   SpO2: 99%   Weight: 77.1 kg (170 lb)   PainSc:   7       Objective   Physical Exam  Vitals and nursing note reviewed. Exam conducted with a chaperone present.   Constitutional:       General: He is not in acute distress.     Appearance: Normal appearance. He is normal weight.   Musculoskeletal:      Thoracic back: Swelling and deformity present. No signs of trauma. Decreased range of motion.        Back:       Comments: Tenderness to palpation across the thoracic spine and just below the scapula.  He has palpable muscle spasm and muscle deformity across the right lateral chest wall.  Tender palpation across the intercostal muscles as well.  Winging of the scapula present With arm extension   Neurological:      Mental Status: He is alert.           Assessment & Plan   Problems Addressed this Visit    None     Visit Diagnoses     Winged scapula of right side    -  Primary      Diagnoses       Codes Comments    Winged scapula of right side    -  Primary ICD-10-CM: M95.8  ICD-9-CM: 736.89           Plan:  1. Can start tizanidine 2 mg 3 times daily for combination of myofascial pain  2. Pending neurology evaluation for long thoracic dysfunction  3. No longer taking amitriptyline  --- Follow-up 2 months           INSPECT REPORT    As part of  the patient's treatment plan, I may be prescribing controlled substances. The patient has been made aware of appropriate use of such medications, including potential risk of somnolence, limited ability to drive and/or work safely, and the potential for dependence or overdose. It has also bee made clear that these medications are for use by this patient only, without concomitant use of alcohol or other substances unless prescribed.     Patient has completed prescribing agreement detailing terms of continued prescribing of controlled substances, including monitoring DANITZA reports, urine drug screening, and pill counts if necessary. The patient is aware that inappropriate use will results in cessation of prescribing such medications.    INSPECT report has been reviewed and scanned into the patient's chart.    As the clinician, I personally reviewed the INSPECT from 8/5/2022.    History and physical exam exhibit continued safe and appropriate use of controlled substances.      EMR Dragon/Transcription disclaimer:   Much of this encounter note is an electronic transcription/translation of spoken language to printed text. The electronic translation of spoken language may permit erroneous, or at times, nonsensical words or phrases to be inadvertently transcribed; Although I have reviewed the note for such errors, some may still exist.

## 2022-10-24 DIAGNOSIS — M79.604 PAIN IN BOTH LOWER EXTREMITIES: ICD-10-CM

## 2022-10-24 DIAGNOSIS — M79.605 PAIN IN BOTH LOWER EXTREMITIES: ICD-10-CM

## 2022-10-24 RX ORDER — IBUPROFEN 800 MG/1
TABLET ORAL
Qty: 90 TABLET | Refills: 0 | Status: SHIPPED | OUTPATIENT
Start: 2022-10-24 | End: 2022-11-18

## 2022-10-31 ENCOUNTER — OFFICE VISIT (OUTPATIENT)
Dept: PAIN MEDICINE | Facility: CLINIC | Age: 37
End: 2022-10-31

## 2022-10-31 VITALS
DIASTOLIC BLOOD PRESSURE: 80 MMHG | OXYGEN SATURATION: 99 % | HEART RATE: 74 BPM | RESPIRATION RATE: 16 BRPM | SYSTOLIC BLOOD PRESSURE: 121 MMHG

## 2022-10-31 DIAGNOSIS — M79.18 MYOFASCIAL PAIN: ICD-10-CM

## 2022-10-31 DIAGNOSIS — M95.8 WINGED SCAPULA OF RIGHT SIDE: Primary | ICD-10-CM

## 2022-10-31 PROCEDURE — 99214 OFFICE O/P EST MOD 30 MIN: CPT | Performed by: STUDENT IN AN ORGANIZED HEALTH CARE EDUCATION/TRAINING PROGRAM

## 2022-10-31 RX ORDER — BACLOFEN 10 MG/1
10 TABLET ORAL 3 TIMES DAILY
Qty: 90 TABLET | Refills: 0 | Status: SHIPPED | OUTPATIENT
Start: 2022-10-31 | End: 2023-01-20

## 2022-10-31 NOTE — PROGRESS NOTES
CHIEF COMPLAINT  Chief Complaint   Patient presents with   • Shoulder Pain     Right shoulder, no narcotics       Primary Care  Shahid Rousseau MD    Subjective   Seng Grimaldo is a 37 y.o. male  who presents for right chest wall and right scapular pain.  He states that approximate 1 year ago, he was working using a large wrench and felt a popping sensation in the right scapula and right chest.  He states that after that time, he experiencing difficult pain and was later found to have winging of his scapula.  He was evaluated with an MRI but no abnormalities were found at that time.  He has been working with an outside physician with rehabilitation and dry needling but up to this point has only got limited benefit.  He was referred here for possible evaluation of intercostal nerve blocks as the other physician does not do interventional techniques.    History of Present Illness     Location: Right chest wall, right scapular  Onset: 1 year ago  Duration: Fairly unchanged  Timing: Constant throughout the day  Quality: Aching, stabbing  Severity: Today: 5       Last Week: 7       Worst: 9  Modifying Factors: The pain is worse with movement of the right arm and right shoulder movements  Functional Deficit: The pain makes it difficult for him to work and use his right arm    Physical Therapy: yes    Interval Update 10/31/2022: He recently had a repeat EMG which does show right long thoracic nerve conduction abnormalities compared to the left.  He states that he did undergo trigger point injections which provided him significant benefit.    The following portions of the patient's history were reviewed and updated as appropriate: allergies, current medications, past family history, past medical history, past social history, past surgical history and problem list.      Current Outpatient Medications:   •  ibuprofen (ADVIL,MOTRIN) 800 MG tablet, TAKE 1 TABLET BY MOUTH EVERY 6 HOURS AS NEEDED FOR PAIN, Disp: 90 tablet, Rfl:  0  •  venlafaxine XR (EFFEXOR-XR) 150 MG 24 hr capsule, Take 1 capsule by mouth Daily., Disp: 90 capsule, Rfl: 3  •  baclofen (LIORESAL) 10 MG tablet, Take 1 tablet by mouth 3 (Three) Times a Day., Disp: 90 tablet, Rfl: 0    Review of Systems   Musculoskeletal: Positive for arthralgias, back pain, myalgias and neck pain. Negative for gait problem.   Neurological: Negative for weakness and numbness.       Vitals:    10/31/22 1031   BP: 121/80   Pulse: 74   Resp: 16   SpO2: 99%   PainSc:   5       Objective   Physical Exam  Vitals and nursing note reviewed. Exam conducted with a chaperone present.   Constitutional:       General: He is not in acute distress.     Appearance: Normal appearance. He is normal weight.   Musculoskeletal:      Thoracic back: Swelling and deformity present. No signs of trauma. Decreased range of motion.        Back:       Comments: Tenderness to palpation across the thoracic spine and just below the scapula.  He has palpable muscle spasm and muscle deformity across the right lateral chest wall.  Tender palpation across the intercostal muscles as well.  Winging of the scapula present With arm extension   Neurological:      Mental Status: He is alert.           Assessment & Plan   Problems Addressed this Visit    None  Visit Diagnoses     Winged scapula of right side    -  Primary    Myofascial pain          Diagnoses       Codes Comments    Winged scapula of right side    -  Primary ICD-10-CM: M95.8  ICD-9-CM: 736.89     Myofascial pain     ICD-10-CM: M79.18  ICD-9-CM: 729.1           Plan:  1. 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 Is primary pain today is more isolated to the cervical paraspinal musculature as well as the right shoulder  3. Will start baclofen 10 mg 3 times daily for symptomatic treatment as he did not get any significant benefit from the interscalene blocks.  We discussed that given his pathology,  his primary treatment will rely on symptomatic management as opposed to treating the pathology.  --- Follow-up 2 months           INSPECT REPORT    As part of the patient's treatment plan, I may be prescribing controlled substances. The patient has been made aware of appropriate use of such medications, including potential risk of somnolence, limited ability to drive and/or work safely, and the potential for dependence or overdose. It has also bee made clear that these medications are for use by this patient only, without concomitant use of alcohol or other substances unless prescribed.     Patient has completed prescribing agreement detailing terms of continued prescribing of controlled substances, including monitoring DANITZA reports, urine drug screening, and pill counts if necessary. The patient is aware that inappropriate use will results in cessation of prescribing such medications.    INSPECT report has been reviewed and scanned into the patient's chart.    As the clinician, I personally reviewed the INSPECT from 10/28/2022.    History and physical exam exhibit continued safe and appropriate use of controlled substances.      EMR Dragon/Transcription disclaimer:   Much of this encounter note is an electronic transcription/translation of spoken language to printed text. The electronic translation of spoken language may permit erroneous, or at times, nonsensical words or phrases to be inadvertently transcribed; Although I have reviewed the note for such errors, some may still exist.

## 2022-11-17 DIAGNOSIS — M79.604 PAIN IN BOTH LOWER EXTREMITIES: ICD-10-CM

## 2022-11-17 DIAGNOSIS — M79.605 PAIN IN BOTH LOWER EXTREMITIES: ICD-10-CM

## 2022-11-18 RX ORDER — IBUPROFEN 800 MG/1
TABLET ORAL
Qty: 90 TABLET | Refills: 0 | Status: SHIPPED | OUTPATIENT
Start: 2022-11-18 | End: 2022-12-09

## 2022-12-06 DIAGNOSIS — F43.0 ACUTE REACTION TO STRESS: ICD-10-CM

## 2022-12-06 RX ORDER — VENLAFAXINE HYDROCHLORIDE 150 MG/1
150 CAPSULE, EXTENDED RELEASE ORAL DAILY
Qty: 90 CAPSULE | Refills: 3 | Status: SHIPPED | OUTPATIENT
Start: 2022-12-06 | End: 2023-01-20

## 2022-12-06 NOTE — TELEPHONE ENCOUNTER
PATIENT CALLED THIS MORNING AND STATES HE HAS THE FLU. HE IS REQUESTING A MEDICATION REFILL OF  venlafaxine XR (EFFEXOR-XR) 150 MG 24 hr capsule  HE HAS ABOUT WEEK AND A HALF LEFT    HE HAS RESCHEDULED HIS APPOINTMENT FOR 1/10/23     [id number] - [place], [place] - [address] [address] [address] AT [facility]  [address]  - [phone] PH - [phone]   [phone]    CALL BACK NUMBER [phone]    PLEASE CALL WHEN SENT TO PHARMACY

## 2022-12-08 DIAGNOSIS — M79.604 PAIN IN BOTH LOWER EXTREMITIES: ICD-10-CM

## 2022-12-08 DIAGNOSIS — M79.605 PAIN IN BOTH LOWER EXTREMITIES: ICD-10-CM

## 2022-12-09 RX ORDER — IBUPROFEN 800 MG/1
TABLET ORAL
Qty: 90 TABLET | Refills: 0 | Status: SHIPPED | OUTPATIENT
Start: 2022-12-09 | End: 2023-02-13

## 2022-12-12 ENCOUNTER — OFFICE VISIT (OUTPATIENT)
Dept: PAIN MEDICINE | Facility: CLINIC | Age: 37
End: 2022-12-12

## 2022-12-12 VITALS
HEART RATE: 74 BPM | SYSTOLIC BLOOD PRESSURE: 133 MMHG | OXYGEN SATURATION: 100 % | RESPIRATION RATE: 16 BRPM | DIASTOLIC BLOOD PRESSURE: 85 MMHG

## 2022-12-12 DIAGNOSIS — M95.8 WINGED SCAPULA OF RIGHT SIDE: Primary | ICD-10-CM

## 2022-12-12 DIAGNOSIS — M79.18 MYOFASCIAL PAIN: ICD-10-CM

## 2022-12-12 PROCEDURE — 99213 OFFICE O/P EST LOW 20 MIN: CPT | Performed by: STUDENT IN AN ORGANIZED HEALTH CARE EDUCATION/TRAINING PROGRAM

## 2022-12-12 NOTE — PROGRESS NOTES
CHIEF COMPLAINT  Chief Complaint   Patient presents with   • Shoulder Pain     Right    • Neck Pain     No narocotics       Primary Care  Shahid Rousseau MD    Subjective   Seng Grimalod is a 37 y.o. male  who presents for right chest wall and right scapular pain.  He states that approximate 1 year ago, he was working using a large wrench and felt a popping sensation in the right scapula and right chest.  He states that after that time, he experiencing difficult pain and was later found to have winging of his scapula.  He was evaluated with an MRI but no abnormalities were found at that time.  He has been working with an outside physician with rehabilitation and dry needling but up to this point has only got limited benefit.  He was referred here for possible evaluation of intercostal nerve blocks as the other physician does not do interventional techniques.    Neck Pain   Pertinent negatives include no numbness or weakness.        Location: Right chest wall, right scapular  Onset: 1 year ago  Duration: Fairly unchanged  Timing: Constant throughout the day  Quality: Aching, stabbing  Severity: Today: 6       Last Week: 7       Worst: 9  Modifying Factors: The pain is worse with movement of the right arm and right shoulder movements  Functional Deficit: The pain makes it difficult for him to work and use his right arm    Physical Therapy: yes    Interval Update 12/12/2022: Has not started his baclofen yet.  Is requesting refill on his pain compounding cream    The following portions of the patient's history were reviewed and updated as appropriate: allergies, current medications, past family history, past medical history, past social history, past surgical history and problem list.      Current Outpatient Medications:   •  baclofen (LIORESAL) 10 MG tablet, Take 1 tablet by mouth 3 (Three) Times a Day., Disp: 90 tablet, Rfl: 0  •  ibuprofen (ADVIL,MOTRIN) 800 MG tablet, TAKE 1 TABLET BY MOUTH EVERY 6 HOURS AS NEEDED  FOR PAIN, Disp: 90 tablet, Rfl: 0  •  venlafaxine XR (EFFEXOR-XR) 150 MG 24 hr capsule, Take 1 capsule by mouth Daily., Disp: 90 capsule, Rfl: 3    Review of Systems   Musculoskeletal: Positive for arthralgias, back pain, myalgias and neck pain. Negative for gait problem.   Neurological: Negative for weakness and numbness.       Vitals:    12/12/22 1033   BP: 133/85   Pulse: 74   Resp: 16   SpO2: 100%   PainSc:   6       Objective   Physical Exam  Vitals and nursing note reviewed. Exam conducted with a chaperone present.   Constitutional:       General: He is not in acute distress.     Appearance: Normal appearance. He is normal weight.   Musculoskeletal:      Thoracic back: Swelling and deformity present. No signs of trauma. Decreased range of motion.        Back:       Comments: Tenderness to palpation across the thoracic spine and just below the scapula.  He has palpable muscle spasm and muscle deformity across the right lateral chest wall.  Tender palpation across the intercostal muscles as well.  Winging of the scapula present With arm extension   Neurological:      Mental Status: He is alert.           Assessment & Plan   Problems Addressed this Visit    None  Visit Diagnoses     Winged scapula of right side    -  Primary    Myofascial pain          Diagnoses       Codes Comments    Winged scapula of right side    -  Primary ICD-10-CM: M95.8  ICD-9-CM: 736.89     Myofascial pain     ICD-10-CM: M79.18  ICD-9-CM: 729.1           Plan:  1. Encouraged him to  his baclofen.  He can send a message over my chart to discuss his response to baclofen  2. Refilled his compounded pain cream  --- Follow-up 3 months           INSPECT REPORT    As part of the patient's treatment plan, I may be prescribing controlled substances. The patient has been made aware of appropriate use of such medications, including potential risk of somnolence, limited ability to drive and/or work safely, and the potential for dependence or  overdose. It has also bee made clear that these medications are for use by this patient only, without concomitant use of alcohol or other substances unless prescribed.     Patient has completed prescribing agreement detailing terms of continued prescribing of controlled substances, including monitoring DANITZA reports, urine drug screening, and pill counts if necessary. The patient is aware that inappropriate use will results in cessation of prescribing such medications.    INSPECT report has been reviewed and scanned into the patient's chart.    As the clinician, I personally reviewed the INSPECT from 12/9/2022.    History and physical exam exhibit continued safe and appropriate use of controlled substances.      EMR Dragon/Transcription disclaimer:   Much of this encounter note is an electronic transcription/translation of spoken language to printed text. The electronic translation of spoken language may permit erroneous, or at times, nonsensical words or phrases to be inadvertently transcribed; Although I have reviewed the note for such errors, some may still exist.

## 2023-01-18 NOTE — PROGRESS NOTES
Subjective   Seng Grimaldo is a 37 y.o. male.     Chief Complaint   Patient presents with   • Anxiety       Anxiety  Presents for follow-up visit. Symptoms include decreased concentration, depressed mood, excessive worry, insomnia, irritability, muscle tension, nervous/anxious behavior and restlessness. Patient reports no chest pain, dizziness, nausea, palpitations, shortness of breath or suicidal ideas. Symptoms occur constantly. The severity of symptoms is moderate. The patient sleeps 6 hours per night. The quality of sleep is poor. Nighttime awakenings: several.     Compliance with medications is %.            I personally reviewed and updated the patient's allergies, medications, problem list, and past medical, surgical, social, and family history. I have reviewed and confirmed the accuracy of the History of Present Illness and Review of Symptoms as documented by the MA/DOMINICK/RN. Shahid Rousseau MD    Family History   Problem Relation Age of Onset   • Heart attack Mother 30   • Heart attack Father 44   • Diabetes Paternal Grandmother    • Heart disease Paternal Grandfather         Ischemic        Social History     Tobacco Use   • Smoking status: Never   • Smokeless tobacco: Current     Types: Chew   • Tobacco comments:     1 can per day   Vaping Use   • Vaping Use: Never used   Substance Use Topics   • Alcohol use: Not Currently   • Drug use: Not Currently     Types: Oxycodone       Past Surgical History:   Procedure Laterality Date   • ENDOSCOPY     • FRACTURE SURGERY Left     Crush injury, left leg, status post fasciotomy, no hardware in place   • OTHER SURGICAL HISTORY      masectomy  left breast   • OTHER SURGICAL HISTORY      lengthening of  ach tendon       Patient Active Problem List   Diagnosis   • Limb pain   • Acute reaction to stress   • Tobacco use disorder   • Hyperthyroidism   • Annual visit for general adult medical examination with abnormal findings   • Benign prostatic hyperplasia with weak  "urinary stream   • Sleep disorder   • COVID-19 virus detected         Current Outpatient Medications:   •  ibuprofen (ADVIL,MOTRIN) 800 MG tablet, TAKE 1 TABLET BY MOUTH EVERY 6 HOURS AS NEEDED FOR PAIN, Disp: 90 tablet, Rfl: 0  •  venlafaxine (EFFEXOR) 75 MG tablet, Take 1 tablet by mouth Daily., Disp: 90 tablet, Rfl: 3         Review of Systems   Constitutional: Positive for irritability. Negative for chills and diaphoresis.   Eyes: Negative for visual disturbance.   Respiratory: Negative for shortness of breath.    Cardiovascular: Negative for chest pain and palpitations.   Gastrointestinal: Negative for abdominal pain and nausea.   Endocrine: Negative for polydipsia and polyphagia.   Musculoskeletal: Negative for neck stiffness.   Skin: Negative for color change and pallor.   Neurological: Negative for dizziness, seizures and syncope.   Hematological: Negative for adenopathy.   Psychiatric/Behavioral: Positive for decreased concentration and depressed mood. Negative for hallucinations and suicidal ideas. The patient is nervous/anxious and has insomnia.        I have reviewed and confirmed the accuracy of the ROS as documented by the MA/LPN/RN Shahid Rousseau MD      Objective   /78 (BP Location: Left arm, Patient Position: Sitting, Cuff Size: Adult)   Pulse 92   Temp 98.6 °F (37 °C)   Resp 16   Ht 182.9 cm (72\")   Wt 75.3 kg (166 lb)   SpO2 98%   BMI 22.51 kg/m²   BP Readings from Last 3 Encounters:   01/20/23 126/78   12/12/22 133/85   10/31/22 121/80     Wt Readings from Last 3 Encounters:   01/20/23 75.3 kg (166 lb)   08/08/22 77.1 kg (170 lb)   06/29/22 77.1 kg (170 lb)     Physical Exam  Constitutional:       Appearance: Normal appearance. He is well-developed. He is not diaphoretic.   Cardiovascular:      Rate and Rhythm: Normal rate and regular rhythm.      Pulses: Normal pulses.      Heart sounds: Normal heart sounds, S1 normal and S2 normal. No murmur heard.    No friction rub. No gallop. "   Pulmonary:      Effort: Pulmonary effort is normal. No accessory muscle usage.      Breath sounds: Normal breath sounds. No stridor. No decreased breath sounds, wheezing, rhonchi or rales.   Abdominal:      General: Bowel sounds are normal. There is no distension.      Palpations: Abdomen is soft. Abdomen is not rigid. There is no mass or pulsatile mass.      Tenderness: There is no abdominal tenderness. There is no guarding or rebound. Negative signs include Bernard's sign.      Hernia: No hernia is present.   Skin:     General: Skin is warm and dry.      Coloration: Skin is not pale.   Neurological:      Mental Status: He is alert and oriented to person, place, and time.      Coordination: Coordination normal.      Gait: Gait normal.         Data / Lab Results:    No results found for: HGBA1C     Lab Results   Component Value Date    LDL 84 12/03/2021    LDL 80 01/31/2020    LDL 80 01/28/2019     Lab Results   Component Value Date    CHOL 146 01/28/2019    CHOL 135 01/18/2019    CHOL 129 02/16/2018     Lab Results   Component Value Date    TRIG 113 12/03/2021    TRIG 92 01/31/2020    TRIG 96 01/28/2019     Lab Results   Component Value Date    HDL 52 12/03/2021    HDL 44 01/31/2020    HDL 48 01/28/2019     Lab Results   Component Value Date    PSA 0.6 01/31/2020     Lab Results   Component Value Date    WBC 4.8 12/03/2021    HGB 15.4 12/03/2021    HCT 45.5 12/03/2021    MCV 89 12/03/2021     12/03/2021     Lab Results   Component Value Date    TSH 0.399 (L) 12/03/2021    X2BYVLL 83 01/28/2019    THYROIDAB <1 01/28/2019      Lab Results   Component Value Date    GLUCOSE 79 12/03/2021    BUN 8 12/03/2021    CREATININE 0.97 12/03/2021    EGFRIFNONA 99 12/03/2021    EGFRIFAFRI 115 12/03/2021    BCR 8 (L) 12/03/2021    K 4.4 12/03/2021    CO2 26 12/03/2021    CALCIUM 9.8 12/03/2021    PROTENTOTREF 6.9 12/03/2021    ALBUMIN 4.6 12/03/2021    LABIL2 2.0 12/03/2021    AST 15 12/03/2021    ALT 17 12/03/2021     No  results found for: JANUARY, RF, SEDRATE   Lab Results   Component Value Date    CRP 0.8 02/16/2018      No results found for: IRON, TIBC, FERRITIN   No results found for: RDAHSHWW20       Assessment & Plan      Medications        Problem List         LOS      Health maintenance.  Doing well.  Vaccines declined.  Start daily health maintenance, screening test, lifestyle modification.  Screening labs drawn.  Anxiety.  Much improved today, has been weaning off Effexor, lower dose written/instructions for weaning given, consider restart if recurrent symptoms.  DDX includes bipolar,   has done well on Lamictal in the past, consider restart if worsening symptoms.  Currently.  Recommend psychiatry referral he states he will consider.  Good social support.    Did not tolerate PRN mirtazapine.  Remote history of prescription narcotic abuse, resolved.  Follow-up recheck, follow-up visit scheduled in January 2022.  Headache.  Improved today possible migraine.  MRI has been recommended in the past, consider scheduling symptoms.  Nocturnal shaking.  Possibly secondary to sleep-related movement disorder.  Check EEG.  Follow-up recheck.  Consider neurology eval if persistent symptoms.  Subclinical hyperthyroidism.  History of, thyroid uptake scan normal 2013.  Thyroid levels have normalized, continue to monitor  Tobacco use.  chews.  Encourage cessation.  See dentist regularly.  BPH.  Has had urology eval in the past.  Stop chewing.  PSA normal 2020.  Start Flomax.  Start PRN Levitra.  Follow-up recheck.  Call or return if worsening symptoms.  Recommend urology follow-up  Sleep disorder.  Did not tolerate mirtazapine.  Has seen [facility], sleep eval upcoming.        Diagnoses and all orders for this visit:    1. Acute reaction to stress (Primary)  -     venlafaxine (EFFEXOR) 75 MG tablet; Take 1 tablet by mouth Daily.  Dispense: 90 tablet; Refill: 3    2. Tobacco use disorder    3. Hyperthyroidism  -     TSH; Future  -     T4, Free;  Future    4. Malaise and fatigue  -     CBC & Differential; Future  -     Comprehensive Metabolic Panel; Future  -     TSH; Future  -     Lipid Panel With / Chol / HDL Ratio; Future  -     T4, Free; Future    5. Screening for hyperlipidemia  -     Lipid Panel With / Chol / HDL Ratio; Future    6. Benign prostatic hyperplasia with weak urinary stream  -     Comprehensive Metabolic Panel; Future              Expected course, medications, and adverse effects discussed.  Call or return if worsening or persistent symptoms.  I wore protective equipment throughout this patient encounter including a mask, gloves, and eye protection.  Hand hygiene was performed before donning protective equipment and after removal when leaving the room. The complete contents of the Assessment and Plan and Data/Lab Results as documented above have been reviewed and addressed by myself with the patient today as part of an ongoing evaluation / treatment plan.  If some of the documentation has been copied from a previous note and is unchanged it indicates that this problem / plan has been assessed today but is stable from a previous visit and no changes have been recommended.

## 2023-01-20 ENCOUNTER — OFFICE VISIT (OUTPATIENT)
Dept: FAMILY MEDICINE CLINIC | Facility: CLINIC | Age: 38
End: 2023-01-20
Payer: COMMERCIAL

## 2023-01-20 VITALS
DIASTOLIC BLOOD PRESSURE: 78 MMHG | TEMPERATURE: 98.6 F | HEART RATE: 92 BPM | HEIGHT: 72 IN | RESPIRATION RATE: 16 BRPM | SYSTOLIC BLOOD PRESSURE: 126 MMHG | WEIGHT: 166 LBS | BODY MASS INDEX: 22.48 KG/M2 | OXYGEN SATURATION: 98 %

## 2023-01-20 DIAGNOSIS — R53.81 MALAISE AND FATIGUE: ICD-10-CM

## 2023-01-20 DIAGNOSIS — R39.12 BENIGN PROSTATIC HYPERPLASIA WITH WEAK URINARY STREAM: ICD-10-CM

## 2023-01-20 DIAGNOSIS — F17.200 TOBACCO USE DISORDER: ICD-10-CM

## 2023-01-20 DIAGNOSIS — R53.83 MALAISE AND FATIGUE: ICD-10-CM

## 2023-01-20 DIAGNOSIS — E05.90 HYPERTHYROIDISM: ICD-10-CM

## 2023-01-20 DIAGNOSIS — F43.0 ACUTE REACTION TO STRESS: Primary | ICD-10-CM

## 2023-01-20 DIAGNOSIS — N40.1 BENIGN PROSTATIC HYPERPLASIA WITH WEAK URINARY STREAM: ICD-10-CM

## 2023-01-20 DIAGNOSIS — Z13.220 SCREENING FOR HYPERLIPIDEMIA: ICD-10-CM

## 2023-01-20 PROCEDURE — 99213 OFFICE O/P EST LOW 20 MIN: CPT | Performed by: FAMILY MEDICINE

## 2023-01-20 RX ORDER — VENLAFAXINE 75 MG/1
75 TABLET ORAL DAILY
Qty: 90 TABLET | Refills: 3 | Status: SHIPPED | OUTPATIENT
Start: 2023-01-20 | End: 2023-02-14 | Stop reason: SDUPTHER

## 2023-02-12 DIAGNOSIS — M79.605 PAIN IN BOTH LOWER EXTREMITIES: ICD-10-CM

## 2023-02-12 DIAGNOSIS — M79.604 PAIN IN BOTH LOWER EXTREMITIES: ICD-10-CM

## 2023-02-13 RX ORDER — IBUPROFEN 800 MG/1
TABLET ORAL
Qty: 90 TABLET | Refills: 0 | Status: SHIPPED | OUTPATIENT
Start: 2023-02-13 | End: 2023-03-07

## 2023-02-14 ENCOUNTER — TELEPHONE (OUTPATIENT)
Dept: FAMILY MEDICINE CLINIC | Facility: CLINIC | Age: 38
End: 2023-02-14
Payer: COMMERCIAL

## 2023-02-14 DIAGNOSIS — F43.0 ACUTE REACTION TO STRESS: Primary | ICD-10-CM

## 2023-02-14 DIAGNOSIS — F43.0 ACUTE REACTION TO STRESS: ICD-10-CM

## 2023-02-14 RX ORDER — VENLAFAXINE 75 MG/1
75 TABLET ORAL DAILY
Qty: 90 TABLET | Refills: 3 | Status: SHIPPED | OUTPATIENT
Start: 2023-02-14 | End: 2023-02-14

## 2023-02-14 RX ORDER — VENLAFAXINE HYDROCHLORIDE 75 MG/1
75 CAPSULE, EXTENDED RELEASE ORAL DAILY
Qty: 90 CAPSULE | Refills: 3 | Status: SHIPPED | OUTPATIENT
Start: 2023-02-14

## 2023-02-14 NOTE — TELEPHONE ENCOUNTER
I spoke marek Yousif the pharmacist. He is aware we are discontinuing the Tablet effexor and going back with the capsule

## 2023-02-14 NOTE — TELEPHONE ENCOUNTER
----- Message from Seng Grimaldo sent at 2/13/2023  6:10 PM EST -----  Regarding: venlafaxine  Contact: [phone]  The 75 mg made me sick to my stomach. I have switched back over to the capsules and want to continue taking them. I’ve been on them for a couple of years. Can you please send in refills for the capsules? You had been sending in refills enough to get me through the year and then I would come in once a year for refills. I would like to continue that.thanks

## 2023-02-14 NOTE — TELEPHONE ENCOUNTER
Okay to send refill for his venlafaxine capsule 75 mg daily #90 with 3 refills, set him up for a physical in 1 year, have him come fasting to that visit and we will plan to draw some labs when he comes, thanks

## 2023-03-07 DIAGNOSIS — M79.604 PAIN IN BOTH LOWER EXTREMITIES: ICD-10-CM

## 2023-03-07 DIAGNOSIS — M79.605 PAIN IN BOTH LOWER EXTREMITIES: ICD-10-CM

## 2023-03-07 RX ORDER — IBUPROFEN 800 MG/1
TABLET ORAL
Qty: 90 TABLET | Refills: 0 | Status: SHIPPED | OUTPATIENT
Start: 2023-03-07

## 2023-03-15 ENCOUNTER — TELEPHONE (OUTPATIENT)
Dept: PAIN MEDICINE | Facility: CLINIC | Age: 38
End: 2023-03-15
Payer: COMMERCIAL

## 2023-03-15 NOTE — TELEPHONE ENCOUNTER
CALLED PT TO SCHEDULE FOLLOW UP APPT, AND LEFT MESSAGE. OKAY FOR HUB TO SCHEDULE PT FOR FOLLOW UP APPT AND READ PT THIS MESSAGE AND LET THEM KNOW WHY I CALLED.

## 2023-04-03 DIAGNOSIS — F43.0 ACUTE REACTION TO STRESS: ICD-10-CM

## 2023-04-03 RX ORDER — VENLAFAXINE HYDROCHLORIDE 150 MG/1
CAPSULE, EXTENDED RELEASE ORAL
Qty: 90 CAPSULE | Refills: 3 | Status: SHIPPED | OUTPATIENT
Start: 2023-04-03

## 2023-05-19 DIAGNOSIS — M79.18 MYOFASCIAL PAIN: ICD-10-CM

## 2023-05-19 DIAGNOSIS — M95.8 WINGED SCAPULA OF RIGHT SIDE: Primary | ICD-10-CM

## 2023-05-26 LAB
ALBUMIN SERPL-MCNC: 4.6 G/DL (ref 4–5)
ALBUMIN/GLOB SERPL: 2.1 {RATIO} (ref 1.2–2.2)
ALP SERPL-CCNC: 85 IU/L (ref 44–121)
ALT SERPL-CCNC: 12 IU/L (ref 0–44)
AST SERPL-CCNC: 10 IU/L (ref 0–40)
BASOPHILS # BLD AUTO: 0 X10E3/UL (ref 0–0.2)
BASOPHILS NFR BLD AUTO: 1 %
BILIRUB SERPL-MCNC: 0.4 MG/DL (ref 0–1.2)
BUN SERPL-MCNC: 10 MG/DL (ref 6–20)
BUN/CREAT SERPL: 10 (ref 9–20)
CALCIUM SERPL-MCNC: 9.6 MG/DL (ref 8.7–10.2)
CHLORIDE SERPL-SCNC: 98 MMOL/L (ref 96–106)
CHOLEST SERPL-MCNC: 144 MG/DL (ref 100–199)
CHOLEST/HDLC SERPL: 3.3 RATIO (ref 0–5)
CO2 SERPL-SCNC: 28 MMOL/L (ref 20–29)
CREAT SERPL-MCNC: 1.02 MG/DL (ref 0.76–1.27)
EGFRCR SERPLBLD CKD-EPI 2021: 96 ML/MIN/1.73
EOSINOPHIL # BLD AUTO: 0 X10E3/UL (ref 0–0.4)
EOSINOPHIL NFR BLD AUTO: 1 %
ERYTHROCYTE [DISTWIDTH] IN BLOOD BY AUTOMATED COUNT: 12.5 % (ref 11.6–15.4)
GLOBULIN SER CALC-MCNC: 2.2 G/DL (ref 1.5–4.5)
GLUCOSE SERPL-MCNC: 75 MG/DL (ref 70–99)
HCT VFR BLD AUTO: 47 % (ref 37.5–51)
HDLC SERPL-MCNC: 43 MG/DL
HGB BLD-MCNC: 15.5 G/DL (ref 13–17.7)
IMM GRANULOCYTES # BLD AUTO: 0 X10E3/UL (ref 0–0.1)
IMM GRANULOCYTES NFR BLD AUTO: 0 %
LDLC SERPL CALC-MCNC: 85 MG/DL (ref 0–99)
LYMPHOCYTES # BLD AUTO: 1.4 X10E3/UL (ref 0.7–3.1)
LYMPHOCYTES NFR BLD AUTO: 35 %
MCH RBC QN AUTO: 28.7 PG (ref 26.6–33)
MCHC RBC AUTO-ENTMCNC: 33 G/DL (ref 31.5–35.7)
MCV RBC AUTO: 87 FL (ref 79–97)
MONOCYTES # BLD AUTO: 0.5 X10E3/UL (ref 0.1–0.9)
MONOCYTES NFR BLD AUTO: 12 %
NEUTROPHILS # BLD AUTO: 2.1 X10E3/UL (ref 1.4–7)
NEUTROPHILS NFR BLD AUTO: 51 %
PLATELET # BLD AUTO: 307 X10E3/UL (ref 150–450)
POTASSIUM SERPL-SCNC: 4.3 MMOL/L (ref 3.5–5.2)
PROT SERPL-MCNC: 6.8 G/DL (ref 6–8.5)
RBC # BLD AUTO: 5.4 X10E6/UL (ref 4.14–5.8)
SODIUM SERPL-SCNC: 139 MMOL/L (ref 134–144)
T4 FREE SERPL-MCNC: 1.46 NG/DL (ref 0.82–1.77)
TRIGL SERPL-MCNC: 80 MG/DL (ref 0–149)
TSH SERPL DL<=0.005 MIU/L-ACNC: 0.22 UIU/ML (ref 0.45–4.5)
VLDLC SERPL CALC-MCNC: 16 MG/DL (ref 5–40)
WBC # BLD AUTO: 4 X10E3/UL (ref 3.4–10.8)

## 2023-06-14 ENCOUNTER — OFFICE VISIT (OUTPATIENT)
Dept: PAIN MEDICINE | Facility: CLINIC | Age: 38
End: 2023-06-14
Payer: COMMERCIAL

## 2023-06-14 VITALS
SYSTOLIC BLOOD PRESSURE: 110 MMHG | OXYGEN SATURATION: 98 % | RESPIRATION RATE: 16 BRPM | DIASTOLIC BLOOD PRESSURE: 72 MMHG | HEART RATE: 72 BPM

## 2023-06-14 DIAGNOSIS — M79.18 MYOFASCIAL PAIN: ICD-10-CM

## 2023-06-14 DIAGNOSIS — M95.8 WINGED SCAPULA OF RIGHT SIDE: Primary | ICD-10-CM

## 2023-06-14 NOTE — PROGRESS NOTES
CHIEF COMPLAINT  Chief Complaint   Patient presents with    Shoulder Pain     6 month f/u  CC  right chest wall and right scapular pain  No Narcotics        Primary Care  Shahid Rousseau MD    Subjective   Seng LOPEZ Dillan is a 37 y.o. male  who presents for right chest wall and right scapular pain.  He states that approximate 1 year ago, he was working using a large wrench and felt a popping sensation in the right scapula and right chest.  He states that after that time, he experiencing difficult pain and was later found to have winging of his scapula.  He was evaluated with an MRI but no abnormalities were found at that time.  He has been working with an outside physician with rehabilitation and dry needling but up to this point has only got limited benefit.  He was referred here for possible evaluation of intercostal nerve blocks as the other physician does not do interventional techniques.    Neck Pain   Pertinent negatives include no numbness or weakness.      Location: Right chest wall, right scapular  Onset: 1 year ago  Duration: Fairly unchanged  Timing: Constant throughout the day  Quality: Aching, stabbing  Severity: Today: 6       Last Week: 7       Worst: 9  Modifying Factors: The pain is worse with movement of the right arm and right shoulder movements  Functional Deficit: The pain makes it difficult for him to work and use his right arm    Physical Therapy: yes    Interval Update 06/14/2023: Doing well with his compounding cream.  He is currently undergoing physical therapy but is getting less benefit.    The following portions of the patient's history were reviewed and updated as appropriate: allergies, current medications, past family history, past medical history, past social history, past surgical history and problem list.      Current Outpatient Medications:     ibuprofen (ADVIL,MOTRIN) 800 MG tablet, TAKE 1 TABLET BY MOUTH EVERY 6 HOURS AS NEEDED FOR PAIN, Disp: 90 tablet, Rfl: 0    Ibuprofen 3 %,  Gabapentin 10 %, Baclofen 2 %, lidocaine 4 %, Apply 1-2 g topically to the appropriate area as directed 3 (Three) to 4 (Four) times daily., Disp: 90 g, Rfl: 2    venlafaxine XR (EFFEXOR-XR) 150 MG 24 hr capsule, TAKE 1 CAPSULE BY MOUTH EVERY DAY, Disp: 90 capsule, Rfl: 3    Review of Systems   Musculoskeletal:  Positive for arthralgias, back pain, myalgias and neck pain. Negative for gait problem.   Neurological:  Negative for weakness and numbness.     Vitals:    06/14/23 1529   BP: 110/72   Pulse: 72   Resp: 16   SpO2: 98%   PainSc:   7       Objective   Physical Exam  Vitals and nursing note reviewed. Exam conducted with a chaperone present.   Constitutional:       General: He is not in acute distress.     Appearance: Normal appearance. He is normal weight.   Musculoskeletal:      Thoracic back: Swelling and deformity present. No signs of trauma. Decreased range of motion.        Back:       Comments: Tenderness to palpation across the thoracic spine and just below the scapula.  He has palpable muscle spasm and muscle deformity across the right lateral chest wall.  Tender palpation across the intercostal muscles as well.  Winging of the scapula present With arm extension   Neurological:      Mental Status: He is alert.         Assessment & Plan   Problems Addressed this Visit    None  Visit Diagnoses       Winged scapula of right side    -  Primary    Relevant Orders    Ambulatory Referral to Physical Therapy Evaluate and treat; Heat, Electrotherapy; Ultrasound, Moist heat (Completed)    Myofascial pain        Relevant Orders    Ambulatory Referral to Physical Therapy Evaluate and treat; Heat, Electrotherapy; Ultrasound, Moist heat (Completed)          Diagnoses         Codes Comments    Winged scapula of right side    -  Primary ICD-10-CM: M95.8  ICD-9-CM: 736.89     Myofascial pain     ICD-10-CM: M79.18  ICD-9-CM: 729.1             Plan:  Continue compounding cream  I will have him go back to physical therapy  with a focus on heat, massage, and electrostimulation.  Given his fairly significant myofascial pain and his fairly neck injury, if his physical therapist can mobilize his arm and shoulder somewhat he may be able to regain some functionality and improve his quality life  --- Follow-up 6 months           INSPECT REPORT    As part of the patient's treatment plan, I may be prescribing controlled substances. The patient has been made aware of appropriate use of such medications, including potential risk of somnolence, limited ability to drive and/or work safely, and the potential for dependence or overdose. It has also bee made clear that these medications are for use by this patient only, without concomitant use of alcohol or other substances unless prescribed.     Patient has completed prescribing agreement detailing terms of continued prescribing of controlled substances, including monitoring DANITZA reports, urine drug screening, and pill counts if necessary. The patient is aware that inappropriate use will results in cessation of prescribing such medications.    INSPECT report has been reviewed and scanned into the patient's chart.    As the clinician, I personally reviewed the INSPECT from 6/13/2023.    History and physical exam exhibit continued safe and appropriate use of controlled substances.      EMR Dragon/Transcription disclaimer:   Much of this encounter note is an electronic transcription/translation of spoken language to printed text. The electronic translation of spoken language may permit erroneous, or at times, nonsensical words or phrases to be inadvertently transcribed; Although I have reviewed the note for such errors, some may still exist.

## 2023-08-03 DIAGNOSIS — M79.605 PAIN IN BOTH LOWER EXTREMITIES: ICD-10-CM

## 2023-08-03 DIAGNOSIS — M79.604 PAIN IN BOTH LOWER EXTREMITIES: ICD-10-CM

## 2023-08-06 RX ORDER — IBUPROFEN 800 MG/1
TABLET ORAL
Qty: 90 TABLET | Refills: 0 | Status: SHIPPED | OUTPATIENT
Start: 2023-08-06

## 2024-01-31 ENCOUNTER — OFFICE VISIT (OUTPATIENT)
Dept: PAIN MEDICINE | Facility: CLINIC | Age: 39
End: 2024-01-31
Payer: OTHER MISCELLANEOUS

## 2024-01-31 VITALS
WEIGHT: 166.7 LBS | DIASTOLIC BLOOD PRESSURE: 89 MMHG | HEART RATE: 80 BPM | OXYGEN SATURATION: 100 % | SYSTOLIC BLOOD PRESSURE: 130 MMHG | RESPIRATION RATE: 16 BRPM | BODY MASS INDEX: 22.61 KG/M2

## 2024-01-31 DIAGNOSIS — M95.8 WINGED SCAPULA OF RIGHT SIDE: Primary | ICD-10-CM

## 2024-01-31 DIAGNOSIS — M79.18 MYOFASCIAL PAIN: ICD-10-CM

## 2024-01-31 DIAGNOSIS — G56.21 CUBITAL TUNNEL SYNDROME ON RIGHT: ICD-10-CM

## 2024-01-31 NOTE — PROGRESS NOTES
CHIEF COMPLAINT  Chief Complaint   Patient presents with    Shoulder Pain     6 month f/u   CC  right chest wall and right scapular pain. No Opoid Use       Primary Care  Shahid Rousseau MD    Subjective   Seng LOPEZ Dillan is a 38 y.o. male  who presents for right chest wall and right scapular pain.  He states that approximate 1 year ago, he was working using a large wrench and felt a popping sensation in the right scapula and right chest.  He states that after that time, he experiencing difficult pain and was later found to have winging of his scapula.  He was evaluated with an MRI but no abnormalities were found at that time.  He has been working with an outside physician with rehabilitation and dry needling but up to this point has only got limited benefit.  He was referred here for possible evaluation of intercostal nerve blocks as the other physician does not do interventional techniques.    Neck Pain   Pertinent negatives include no numbness or weakness.        Location: Right chest wall, right scapular  Onset: 1 year ago  Duration: Fairly unchanged  Timing: Constant throughout the day  Quality: Aching, stabbing  Severity: Today: 6       Last Week: 7       Worst: 9  Modifying Factors: The pain is worse with movement of the right arm and right shoulder movements  Functional Deficit: The pain makes it difficult for him to work and use his right arm    Physical Therapy: yes    Interval Update 01/31/2024: Continue compounding cream.  He is pending evaluation by a different orthopedic surgeon for possible surgical intervention.  Is requesting referral to hand surgery as he has numbness and tingling in the right hand felt to be from cubital tunnel syndrome.  He also complains of significant pain around the elbow region worse with movement physical activity    The following portions of the patient's history were reviewed and updated as appropriate: allergies, current medications, past family history, past medical  history, past social history, past surgical history and problem list.      Current Outpatient Medications:     ibuprofen (ADVIL,MOTRIN) 800 MG tablet, TAKE 1 TABLET BY MOUTH EVERY 6 HOURS AS NEEDED FOR PAIN, Disp: 90 tablet, Rfl: 0    Ibuprofen 3 %, Gabapentin 10 %, Baclofen 2 %, lidocaine 4 %, Apply 1-2 g topically to the appropriate area as directed 3 (Three) to 4 (Four) times daily., Disp: 90 g, Rfl: 4    venlafaxine XR (EFFEXOR-XR) 150 MG 24 hr capsule, TAKE 1 CAPSULE BY MOUTH EVERY DAY, Disp: 90 capsule, Rfl: 3    Review of Systems   Musculoskeletal:  Positive for arthralgias, back pain, myalgias and neck pain. Negative for gait problem.   Neurological:  Negative for weakness and numbness.       Vitals:    01/31/24 1306   BP: 130/89   BP Location: Right arm   Patient Position: Sitting   Cuff Size: Adult   Pulse: 80   Resp: 16   SpO2: 100%   Weight: 75.6 kg (166 lb 11.2 oz)   PainSc:   5   PainLoc: Elbow       Objective   Physical Exam  Vitals and nursing note reviewed. Exam conducted with a chaperone present.   Constitutional:       General: He is not in acute distress.     Appearance: Normal appearance. He is normal weight.   Musculoskeletal:      Thoracic back: Swelling and deformity present. No signs of trauma. Decreased range of motion.        Back:       Comments: Tenderness to palpation across the thoracic spine and just below the scapula.  He has palpable muscle spasm and muscle deformity across the right lateral chest wall.  Tender palpation across the intercostal muscles as well.  Winging of the scapula present With arm extension   Neurological:      Mental Status: He is alert.           Assessment & Plan   Problems Addressed this Visit    None  Visit Diagnoses       Winged scapula of right side    -  Primary    Relevant Medications    Ibuprofen 3 %, Gabapentin 10 %, Baclofen 2 %, lidocaine 4 %    Myofascial pain        Relevant Medications    Ibuprofen 3 %, Gabapentin 10 %, Baclofen 2 %, lidocaine 4 %     Cubital tunnel syndrome on right              Diagnoses         Codes Comments    Winged scapula of right side    -  Primary ICD-10-CM: M95.8  ICD-9-CM: 736.89     Myofascial pain     ICD-10-CM: M79.18  ICD-9-CM: 729.1     Cubital tunnel syndrome on right     ICD-10-CM: G56.21  ICD-9-CM: 354.2             Plan:  Continue compounding cream  He should follow-up with his orthopedic surgeon regarding a possible surgical intervention  Can refer to the hand surgeon for possible cubital tunnel  --- Follow-up 6 months           INSPECT REPORT    As part of the patient's treatment plan, I may be prescribing controlled substances. The patient has been made aware of appropriate use of such medications, including potential risk of somnolence, limited ability to drive and/or work safely, and the potential for dependence or overdose. It has also bee made clear that these medications are for use by this patient only, without concomitant use of alcohol or other substances unless prescribed.     Patient has completed prescribing agreement detailing terms of continued prescribing of controlled substances, including monitoring DANITZA reports, urine drug screening, and pill counts if necessary. The patient is aware that inappropriate use will results in cessation of prescribing such medications.    INSPECT report has been reviewed and scanned into the patient's chart.    As the clinician, I personally reviewed the INSPECT from 1/30/2024.    History and physical exam exhibit continued safe and appropriate use of controlled substances.      EMR Dragon/Transcription disclaimer:   Much of this encounter note is an electronic transcription/translation of spoken language to printed text. The electronic translation of spoken language may permit erroneous, or at times, nonsensical words or phrases to be inadvertently transcribed; Although I have reviewed the note for such errors, some may still exist.

## 2024-02-12 ENCOUNTER — TELEPHONE (OUTPATIENT)
Dept: PAIN MEDICINE | Facility: CLINIC | Age: 39
End: 2024-02-12
Payer: COMMERCIAL

## 2024-02-12 NOTE — TELEPHONE ENCOUNTER
Caller: HA ROCA    Relationship to patient: SELF    Best call back number: [phone]    Chief complaint:  PATIENT ASKING FOR REFERRAL TO [facility] ON 1/31/24 BE RE SENT. OFFICE TELLS PATIENT THEY DO NOT HAVE OUR REFERRAL.    Type of visit: OUTSIDE REFERRAL

## 2024-03-16 DIAGNOSIS — F43.0 ACUTE REACTION TO STRESS: ICD-10-CM

## 2024-03-21 ENCOUNTER — PATIENT MESSAGE (OUTPATIENT)
Dept: FAMILY MEDICINE CLINIC | Facility: CLINIC | Age: 39
End: 2024-03-21
Payer: COMMERCIAL

## 2024-03-21 DIAGNOSIS — M79.605 PAIN IN BOTH LOWER EXTREMITIES: ICD-10-CM

## 2024-03-21 DIAGNOSIS — F43.0 ACUTE REACTION TO STRESS: ICD-10-CM

## 2024-03-21 DIAGNOSIS — M79.604 PAIN IN BOTH LOWER EXTREMITIES: ICD-10-CM

## 2024-03-21 RX ORDER — VENLAFAXINE HYDROCHLORIDE 150 MG/1
CAPSULE, EXTENDED RELEASE ORAL
Qty: 90 CAPSULE | Refills: 3 | OUTPATIENT
Start: 2024-03-21

## 2024-03-21 RX ORDER — VENLAFAXINE HYDROCHLORIDE 150 MG/1
150 CAPSULE, EXTENDED RELEASE ORAL DAILY
Qty: 30 CAPSULE | Refills: 0 | Status: SHIPPED | OUTPATIENT
Start: 2024-03-21

## 2024-03-21 RX ORDER — VENLAFAXINE HYDROCHLORIDE 150 MG/1
150 CAPSULE, EXTENDED RELEASE ORAL DAILY
Qty: 90 CAPSULE | Refills: 3 | OUTPATIENT
Start: 2024-03-21

## 2024-03-21 RX ORDER — IBUPROFEN 800 MG/1
800 TABLET ORAL EVERY 6 HOURS PRN
Qty: 90 TABLET | Refills: 0 | OUTPATIENT
Start: 2024-03-21

## 2024-03-21 NOTE — TELEPHONE ENCOUNTER
Duplicate. One request is pending. Patient has not been seen in over a year. Sent patient a message on [employer], since he communicated about refill request through [employer].

## 2024-03-27 DIAGNOSIS — F43.0 ACUTE REACTION TO STRESS: ICD-10-CM

## 2024-03-27 RX ORDER — VENLAFAXINE HYDROCHLORIDE 150 MG/1
150 CAPSULE, EXTENDED RELEASE ORAL DAILY
Qty: 30 CAPSULE | Refills: 0 | Status: SHIPPED | OUTPATIENT
Start: 2024-03-27

## 2024-04-03 NOTE — PROGRESS NOTES
Subjective   Seng Grimaldo is a 38 y.o. male.     Chief Complaint   Patient presents with    Anxiety       Anxiety  Presents for follow-up visit. Symptoms include decreased concentration, depressed mood, excessive worry, insomnia, irritability, muscle tension, nervous/anxious behavior and restlessness. Patient reports no chest pain, dizziness, nausea, palpitations, shortness of breath or suicidal ideas. Symptoms occur constantly. The severity of symptoms is moderate. The patient sleeps 6 hours per night. The quality of sleep is poor. Nighttime awakenings: several.     Compliance with medications is % (Effexor 150mg).            I personally reviewed and updated the patient's allergies, medications, problem list, and past medical, surgical, social, and family history. I have reviewed and confirmed the accuracy of the History of Present Illness and Review of Symptoms as documented by the MA/VIVIENNEN/RN. Shahid Rousseau MD    Family History   Problem Relation Age of Onset    Heart attack Mother 30    Heart attack Father 44    Diabetes Paternal Grandmother     Heart disease Paternal Grandfather         Ischemic        Social History     Tobacco Use    Smoking status: Never    Smokeless tobacco: Current     Types: Chew    Tobacco comments:     1 can per day   Vaping Use    Vaping status: Never Used   Substance Use Topics    Alcohol use: Not Currently    Drug use: Not Currently     Types: Oxycodone       Past Surgical History:   Procedure Laterality Date    ENDOSCOPY      FRACTURE SURGERY Left     Crush injury, left leg, status post fasciotomy, no hardware in place    OTHER SURGICAL HISTORY      masectomy  left breast    OTHER SURGICAL HISTORY      lengthening of  ach tendon       Patient Active Problem List   Diagnosis    Limb pain    Acute reaction to stress    Tobacco use disorder    Hyperthyroidism    Annual visit for general adult medical examination with abnormal findings    Benign prostatic hyperplasia with weak  "urinary stream    Sleep disorder    COVID-19 virus detected         Current Outpatient Medications:     ibuprofen (ADVIL,MOTRIN) 800 MG tablet, TAKE 1 TABLET BY MOUTH EVERY 6 HOURS AS NEEDED FOR PAIN, Disp: 90 tablet, Rfl: 0    Ibuprofen 3 %, Gabapentin 10 %, Baclofen 2 %, lidocaine 4 %, Apply 1-2 g topically to the appropriate area as directed 3 (Three) to 4 (Four) times daily., Disp: 90 g, Rfl: 4    venlafaxine XR (EFFEXOR-XR) 150 MG 24 hr capsule, Take 1 capsule by mouth Daily., Disp: 90 capsule, Rfl: 3         Review of Systems   Constitutional:  Positive for irritability. Negative for chills and diaphoresis.   HENT:  Negative for trouble swallowing and voice change.    Eyes:  Negative for visual disturbance.   Respiratory:  Negative for shortness of breath.    Cardiovascular:  Negative for chest pain and palpitations.   Gastrointestinal:  Negative for abdominal pain and nausea.   Endocrine: Negative for polydipsia and polyphagia.   Genitourinary:  Negative for hematuria.   Musculoskeletal:  Negative for neck stiffness.   Skin:  Negative for color change and pallor.   Allergic/Immunologic: Negative for immunocompromised state.   Neurological:  Negative for dizziness, seizures and syncope.   Hematological:  Negative for adenopathy.   Psychiatric/Behavioral:  Positive for decreased concentration and depressed mood. Negative for hallucinations, sleep disturbance and suicidal ideas. The patient is nervous/anxious and has insomnia.        I have reviewed and confirmed the accuracy of the ROS as documented by the MA/LPN/RN Shahid Rousseau MD      Objective   /72 (BP Location: Left arm, Patient Position: Sitting, Cuff Size: Adult)   Pulse 101   Temp 98.2 °F (36.8 °C) (Temporal)   Resp 18   Ht 182.9 cm (72.01\")   Wt 75.5 kg (166 lb 6.4 oz)   SpO2 97%   BMI 22.56 kg/m²   BP Readings from Last 3 Encounters:   04/05/24 128/72   01/31/24 130/89   06/14/23 110/72     Wt Readings from Last 3 Encounters:   04/05/24 " 75.5 kg (166 lb 6.4 oz)   01/31/24 75.6 kg (166 lb 11.2 oz)   01/20/23 75.3 kg (166 lb)     Physical Exam  Constitutional:       Appearance: He is well-developed. He is not diaphoretic.   HENT:      Head: Normocephalic.      Right Ear: Tympanic membrane, ear canal and external ear normal.      Left Ear: Tympanic membrane, ear canal and external ear normal.      Nose: Nose normal.   Eyes:      General: Lids are normal.      Conjunctiva/sclera: Conjunctivae normal.      Pupils: Pupils are equal, round, and reactive to light.   Neck:      Thyroid: No thyromegaly.      Vascular: No carotid bruit or JVD.      Trachea: No tracheal deviation.   Cardiovascular:      Rate and Rhythm: Normal rate and regular rhythm.      Heart sounds: Normal heart sounds. No murmur heard.     No friction rub. No gallop.   Pulmonary:      Effort: Pulmonary effort is normal.      Breath sounds: Normal breath sounds. No stridor. No decreased breath sounds, wheezing or rales.   Abdominal:      General: Bowel sounds are normal. There is no distension.      Palpations: Abdomen is soft. There is no mass.      Tenderness: There is no abdominal tenderness. There is no guarding or rebound.      Hernia: No hernia is present.   Lymphadenopathy:      Head:      Right side of head: No submental, submandibular, tonsillar, preauricular, posterior auricular or occipital adenopathy.      Left side of head: No submental, submandibular, tonsillar, preauricular, posterior auricular or occipital adenopathy.      Cervical: No cervical adenopathy.   Skin:     General: Skin is warm and dry.      Coloration: Skin is not pale.   Neurological:      Mental Status: He is alert and oriented to person, place, and time.      Cranial Nerves: No cranial nerve deficit.      Sensory: No sensory deficit.      Coordination: Coordination normal.      Gait: Gait normal.      Deep Tendon Reflexes: Reflexes are normal and symmetric.         Data / Lab Results:    No results found for:  "\"HGBA1C\"     Lab Results   Component Value Date    LDL 85 05/25/2023    LDL 84 12/03/2021    LDL 80 01/31/2020     Lab Results   Component Value Date    CHOL 146 01/28/2019    CHOL 135 01/18/2019    CHOL 129 02/16/2018     Lab Results   Component Value Date    TRIG 80 05/25/2023    TRIG 113 12/03/2021    TRIG 92 01/31/2020     Lab Results   Component Value Date    HDL 43 05/25/2023    HDL 52 12/03/2021    HDL 44 01/31/2020     Lab Results   Component Value Date    PSA 0.6 01/31/2020     Lab Results   Component Value Date    WBC 4.0 05/25/2023    HGB 15.5 05/25/2023    HCT 47.0 05/25/2023    MCV 87 05/25/2023     05/25/2023     Lab Results   Component Value Date    TSH 0.224 (L) 05/25/2023    W5CQACN 83 01/28/2019    THYROIDAB <1 01/28/2019      Lab Results   Component Value Date    GLUCOSE 75 05/25/2023    BUN 10 05/25/2023    CREATININE 1.02 05/25/2023    EGFRIFNONA 99 12/03/2021    EGFRIFAFRI 115 12/03/2021    BCR 10 05/25/2023    K 4.3 05/25/2023    CO2 28 05/25/2023    CALCIUM 9.6 05/25/2023    PROTENTOTREF 6.8 05/25/2023    ALBUMIN 4.6 05/25/2023    LABIL2 2.1 05/25/2023    AST 10 05/25/2023    ALT 12 05/25/2023     No results found for: \"JANUARY\", \"RF\", \"SEDRATE\"   Lab Results   Component Value Date    CRP 0.8 02/16/2018      No results found for: \"IRON\", \"TIBC\", \"FERRITIN\"   No results found for: \"WWVPSGTS75\"       Assessment & Plan      Medications        Problem List         LOS    Health maintenance.  Doing well.  Vaccines declined.  Start daily health maintenance, screening test, lifestyle modification.  Screening labs drawn.  Anxiety.  Doing well on Effexor, did not tolerate attempt at wean.  DDX includes bipolar,   has done well on Lamictal in the past, consider restart if worsening symptoms.  With sleeping difficulty add melatonin.  Consider psychiatry referral.  Good social support.    Did not tolerate PRN mirtazapine.  Remote history of prescription narcotic abuse, resolved.  Follow-up recheck, " follow-up visit scheduled in January 2022.  Headache.  Improved today possible migraine.  MRI has been recommended in the past, consider scheduling symptoms.  Nocturnal shaking.  Possibly secondary to sleep-related movement disorder.  Check EEG.  Follow-up recheck.  Consider neurology eval if persistent symptoms.  Subclinical hyperthyroidism.  History of, thyroid uptake scan normal 2013.  Thyroid levels have normalized, continue to monitor  Tobacco use.  chews.  Encourage cessation.  See dentist regularly.  BPH.  Has had urology eval in the past.  Stop chewing.  PSA normal 2020.  Start Flomax.  Start PRN Levitra.  Follow-up recheck.  Call or return if worsening symptoms.  Recommend urology follow-up  Sleep disorder.  Did not tolerate mirtazapine.  Has seen [facility], sleep eval upcoming.      Diagnoses and all orders for this visit:    1. Acute reaction to stress (Primary)  -     venlafaxine XR (EFFEXOR-XR) 150 MG 24 hr capsule; Take 1 capsule by mouth Daily.  Dispense: 90 capsule; Refill: 3    2. Hyperthyroidism    3. Sleep disorder              Expected course, medications, and adverse effects discussed.  Call or return if worsening or persistent symptoms.  I wore protective equipment throughout this patient encounter including a mask, gloves, and eye protection.  Hand hygiene was performed before donning protective equipment and after removal when leaving the room. The complete contents of the Assessment and Plan and Data/Lab Results as documented above have been reviewed and addressed by myself with the patient today as part of an ongoing evaluation / treatment plan.  If some of the documentation has been copied from a previous note and is unchanged it indicates that this problem / plan has been assessed today but is stable from a previous visit and no changes have been recommended.

## 2024-04-05 ENCOUNTER — OFFICE VISIT (OUTPATIENT)
Dept: FAMILY MEDICINE CLINIC | Facility: CLINIC | Age: 39
End: 2024-04-05
Payer: COMMERCIAL

## 2024-04-05 VITALS
RESPIRATION RATE: 18 BRPM | BODY MASS INDEX: 22.54 KG/M2 | SYSTOLIC BLOOD PRESSURE: 128 MMHG | DIASTOLIC BLOOD PRESSURE: 72 MMHG | HEIGHT: 72 IN | WEIGHT: 166.4 LBS | HEART RATE: 101 BPM | TEMPERATURE: 98.2 F | OXYGEN SATURATION: 97 %

## 2024-04-05 DIAGNOSIS — G47.9 SLEEP DISORDER: ICD-10-CM

## 2024-04-05 DIAGNOSIS — F43.0 ACUTE REACTION TO STRESS: Primary | ICD-10-CM

## 2024-04-05 DIAGNOSIS — E05.90 HYPERTHYROIDISM: ICD-10-CM

## 2024-04-05 PROCEDURE — 99213 OFFICE O/P EST LOW 20 MIN: CPT | Performed by: FAMILY MEDICINE

## 2024-04-05 RX ORDER — VENLAFAXINE HYDROCHLORIDE 150 MG/1
150 CAPSULE, EXTENDED RELEASE ORAL DAILY
Qty: 90 CAPSULE | Refills: 3 | Status: SHIPPED | OUTPATIENT
Start: 2024-04-05

## 2024-05-24 ENCOUNTER — TELEPHONE (OUTPATIENT)
Dept: PAIN MEDICINE | Facility: CLINIC | Age: 39
End: 2024-05-24
Payer: COMMERCIAL

## 2024-05-24 NOTE — TELEPHONE ENCOUNTER
[facility] received a fax that requires your attention. The document has been indexed to the patient’s chart for your review.      Reason for sending: LONG TERM DISABILITY PAPERWORK    Documents Description: LONG TERM DISABILITY PAPERWORK-[facility]-5.22.24    Name of Sender: [facility]    Date Indexed: 5.24.24    Notes (if needed):     HR=48 bpm, NIBP=93/49 mmhg, SpO2=98.0 %

## 2024-05-30 NOTE — TELEPHONE ENCOUNTER
Caller: LARRY    Relationship to patient: [facility]    Best call back number: [phone]*    Patient is needing: LARRY IS CALLING TO CHECK THE STATUS OF THE PAPERWORK REQUEST.. PLEASE ADVISE..

## 2024-06-07 ENCOUNTER — TELEPHONE (OUTPATIENT)
Dept: PAIN MEDICINE | Facility: CLINIC | Age: 39
End: 2024-06-07
Payer: COMMERCIAL

## 2024-06-07 NOTE — TELEPHONE ENCOUNTER
Spoke with Morgan with BPT to inform we do not do Disability forms and that he would need to contact the pt to find out who to sent the forms to.  He requested Medical Records and I provided the number for him to call.

## 2024-06-07 NOTE — TELEPHONE ENCOUNTER
Caller: LARRY     Relationship to patient: [facility]     Best call back number: [phone]     Patient is needing: LARRY IS CALLING TO CHECK THE STATUS OF THE PAPERWORK REQUEST.  FAX WAS RECEIVED ON 5/24/24, FAXED TO OFFICE ON 5/22/24. LTD PAPERWORK [facility] IS WAITING ON RESPONSE

## 2024-06-28 ENCOUNTER — TELEPHONE (OUTPATIENT)
Dept: FAMILY MEDICINE CLINIC | Facility: CLINIC | Age: 39
End: 2024-06-28
Payer: COMMERCIAL

## 2024-06-28 NOTE — TELEPHONE ENCOUNTER
----- Message from [employer] [place] sent at 6/28/2024  4:53 PM EDT -----  Regarding: Psa   Contact: [phone]  Hi, I wanted to confirm that you have ordered a PSA lab with my other labs to have drawn before I come in for my physical?

## 2024-08-19 ENCOUNTER — TELEPHONE (OUTPATIENT)
Dept: PAIN MEDICINE | Facility: CLINIC | Age: 39
End: 2024-08-19
Payer: COMMERCIAL

## 2024-08-19 DIAGNOSIS — M95.8 WINGED SCAPULA OF RIGHT SIDE: ICD-10-CM

## 2024-08-19 DIAGNOSIS — M79.18 MYOFASCIAL PAIN: ICD-10-CM

## 2024-09-04 ENCOUNTER — OFFICE VISIT (OUTPATIENT)
Dept: PAIN MEDICINE | Facility: CLINIC | Age: 39
End: 2024-09-04
Payer: OTHER MISCELLANEOUS

## 2024-09-04 VITALS
RESPIRATION RATE: 16 BRPM | SYSTOLIC BLOOD PRESSURE: 131 MMHG | DIASTOLIC BLOOD PRESSURE: 94 MMHG | HEART RATE: 75 BPM | WEIGHT: 162 LBS | BODY MASS INDEX: 21.97 KG/M2 | OXYGEN SATURATION: 98 %

## 2024-09-04 DIAGNOSIS — M79.604 PAIN IN BOTH LOWER EXTREMITIES: ICD-10-CM

## 2024-09-04 DIAGNOSIS — M95.8 WINGED SCAPULA OF RIGHT SIDE: ICD-10-CM

## 2024-09-04 DIAGNOSIS — M79.605 PAIN IN BOTH LOWER EXTREMITIES: ICD-10-CM

## 2024-09-04 DIAGNOSIS — M79.18 MYOFASCIAL PAIN: ICD-10-CM

## 2024-09-04 PROCEDURE — 99214 OFFICE O/P EST MOD 30 MIN: CPT | Performed by: STUDENT IN AN ORGANIZED HEALTH CARE EDUCATION/TRAINING PROGRAM

## 2024-09-04 RX ORDER — IBUPROFEN 800 MG/1
800 TABLET, FILM COATED ORAL EVERY 8 HOURS PRN
Qty: 90 TABLET | Refills: 1 | Status: SHIPPED | OUTPATIENT
Start: 2024-09-04

## 2024-09-04 NOTE — PROGRESS NOTES
CHIEF COMPLAINT  Chief Complaint   Patient presents with    Neck Pain     No opioids    Back Pain    Hand Pain    Elbow Pain     both       Primary Care  Shahdi Rousseau MD    Subjective   Seng Grimaldo is a 39 y.o. male  who presents for right chest wall and right scapular pain.  He states that approximate 1 year ago, he was working using a large wrench and felt a popping sensation in the right scapula and right chest.  He states that after that time, he experiencing difficult pain and was later found to have winging of his scapula.  He was evaluated with an MRI but no abnormalities were found at that time.  He has been working with an outside physician with rehabilitation and dry needling but up to this point has only got limited benefit.  He was referred here for possible evaluation of intercostal nerve blocks as the other physician does not do interventional techniques.    Neck Pain   Pertinent negatives include no numbness or weakness.   Back Pain  Pertinent negatives include no numbness or weakness.   Hand Pain   Pertinent negatives include no numbness.   Elbow Pain  Associated symptoms include arthralgias, myalgias and neck pain. Pertinent negatives include no numbness or weakness.        Location: Right chest wall, right scapular  Onset: 1 year ago  Duration: Fairly unchanged  Timing: Constant throughout the day  Quality: Aching, stabbing  Severity: Today: 6       Last Week: 7       Worst: 9  Modifying Factors: The pain is worse with movement of the right arm and right shoulder movements  Functional Deficit: The pain makes it difficult for him to work and use his right arm    Physical Therapy: yes    Interval Update 09/04/2024: This fairly well with combination compounding cream as well as ibuprofen 800 mg twice daily.  Does get occasional carpal tunnel injections which are beneficial as well    The following portions of the patient's history were reviewed and updated as appropriate: allergies, current  medications, past family history, past medical history, past social history, past surgical history and problem list.      Current Outpatient Medications:     ibuprofen (ADVIL,MOTRIN) 800 MG tablet, Take 1 tablet by mouth Every 8 (Eight) Hours As Needed for Moderate Pain., Disp: 90 tablet, Rfl: 1    Ibuprofen 3 %, Gabapentin 10 %, Baclofen 2 %, lidocaine 4 %, Ketamine HCl 4 %, Apply 1-2 g topically to the appropriate area as directed 3 (Three) to 4 (Four) times daily., Disp: 90 g, Rfl: 2    venlafaxine XR (EFFEXOR-XR) 150 MG 24 hr capsule, Take 1 capsule by mouth Daily., Disp: 90 capsule, Rfl: 3    Review of Systems   Musculoskeletal:  Positive for arthralgias, back pain, myalgias and neck pain. Negative for gait problem.   Neurological:  Negative for weakness and numbness.       Vitals:    09/04/24 1502   BP: 131/94   Pulse: 75   Resp: 16   SpO2: 98%   Weight: 73.5 kg (162 lb)   PainSc:   8       Objective   Physical Exam  Vitals and nursing note reviewed. Exam conducted with a chaperone present.   Constitutional:       General: He is not in acute distress.     Appearance: Normal appearance. He is normal weight.   Musculoskeletal:      Thoracic back: Swelling and deformity present. No signs of trauma. Decreased range of motion.        Back:       Comments: Tenderness to palpation across the thoracic spine and just below the scapula.  He has palpable muscle spasm and muscle deformity across the right lateral chest wall.  Tender palpation across the intercostal muscles as well.  Winging of the scapula present With arm extension   Neurological:      Mental Status: He is alert.           Assessment & Plan   Problems Addressed this Visit       Limb pain    Relevant Medications    ibuprofen (ADVIL,MOTRIN) 800 MG tablet     Other Visit Diagnoses       Winged scapula of right side        Relevant Medications    Ibuprofen 3 %, Gabapentin 10 %, Baclofen 2 %, lidocaine 4 %, Ketamine HCl 4 %    Myofascial pain        Relevant  Medications    Ibuprofen 3 %, Gabapentin 10 %, Baclofen 2 %, lidocaine 4 %, Ketamine HCl 4 %          Diagnoses         Codes Comments    Pain in both lower extremities     ICD-10-CM: M79.604, M79.605  ICD-9-CM: 729.5     Winged scapula of right side     ICD-10-CM: M95.8  ICD-9-CM: 736.89     Myofascial pain     ICD-10-CM: M79.18  ICD-9-CM: 729.1             Plan:  Continue compounding cream as well as 800 mg ibuprofen  Overall, relatively stable.  He reports that he is working with Worker's Compensation to try to get him to cover his medications which are clearly work-related  He has paperwork on file for the long-term disability group to obtain his records  --- Follow-up 6 months           INSPECT REPORT    As part of the patient's treatment plan, I may be prescribing controlled substances. The patient has been made aware of appropriate use of such medications, including potential risk of somnolence, limited ability to drive and/or work safely, and the potential for dependence or overdose. It has also bee made clear that these medications are for use by this patient only, without concomitant use of alcohol or other substances unless prescribed.     Patient has completed prescribing agreement detailing terms of continued prescribing of controlled substances, including monitoring DANITZA reports, urine drug screening, and pill counts if necessary. The patient is aware that inappropriate use will results in cessation of prescribing such medications.    INSPECT report has been reviewed and scanned into the patient's chart.    As the clinician, I personally reviewed the INSPECT from 9/3/2024.    History and physical exam exhibit continued safe and appropriate use of controlled substances.      EMR Dragon/Transcription disclaimer:   Much of this encounter note is an electronic transcription/translation of spoken language to printed text. The electronic translation of spoken language may permit erroneous, or at times,  nonsensical words or phrases to be inadvertently transcribed; Although I have reviewed the note for such errors, some may still exist.

## 2024-09-10 ENCOUNTER — TELEPHONE (OUTPATIENT)
Dept: PAIN MEDICINE | Facility: CLINIC | Age: 39
End: 2024-09-10
Payer: COMMERCIAL

## 2025-01-14 NOTE — PROGRESS NOTES
Subjective   Seng Grimaldo is a 39 y.o. male.     Chief Complaint   Patient presents with    Palpitations       Heart Problem  Onset was in the past 7 days.   Symptoms occur constantly.   Symptoms include headaches.    Pertinent negative symptoms include no chest pain, no fatigue, no joint swelling, no numbness, no dysuria, no vertigo, no visual change and no weakness.   Symptoms comment: Palpitations.   Aggravating factors include nothing.   Treatments tried: Taking Aspirin.            I personally reviewed and updated the patient's allergies, medications, problem list, and past medical, surgical, social, and family history. I have reviewed and confirmed the accuracy of the History of Present Illness and Review of Symptoms as documented by the MA/LPN/RN. Shahid Rousseau MD    Family History   Problem Relation Age of Onset    Heart attack Mother 30    Heart attack Father 44    Diabetes Paternal Grandmother     Heart disease Paternal Grandfather         Ischemic        Social History     Tobacco Use    Smoking status: Never    Smokeless tobacco: Current     Types: Chew    Tobacco comments:     1 can per day   Vaping Use    Vaping status: Never Used   Substance Use Topics    Alcohol use: Not Currently    Drug use: Not Currently     Types: Oxycodone       Past Surgical History:   Procedure Laterality Date    ENDOSCOPY      FRACTURE SURGERY Left     Crush injury, left leg, status post fasciotomy, no hardware in place    OTHER SURGICAL HISTORY      masectomy  left breast    OTHER SURGICAL HISTORY      lengthening of  ach tendon       Patient Active Problem List   Diagnosis    Limb pain    Acute reaction to stress    Tobacco use disorder    Hyperthyroidism    Annual visit for general adult medical examination with abnormal findings    Benign prostatic hyperplasia with weak urinary stream    Sleep disorder    COVID-19 virus detected         Current Outpatient Medications:     ibuprofen (ADVIL,MOTRIN) 800 MG tablet, Take 1  "tablet by mouth Every 8 (Eight) Hours As Needed for Moderate Pain., Disp: 90 tablet, Rfl: 1    Ibuprofen 3 %, Gabapentin 10 %, Baclofen 2 %, lidocaine 4 %, Apply 1-2 g topically to the appropriate area as directed 3 (Three) to 4 (Four) times daily., Disp: 90 g, Rfl: 2    venlafaxine XR (EFFEXOR-XR) 150 MG 24 hr capsule, Take 1 capsule by mouth Daily., Disp: 90 capsule, Rfl: 3         Review of Systems   Constitutional:  Negative for fatigue.   HENT:  Negative for trouble swallowing and voice change.    Eyes:  Negative for visual disturbance.   Respiratory:  Negative for shortness of breath.    Cardiovascular:  Negative for chest pain and palpitations.   Endocrine: Negative for polydipsia and polyphagia.   Genitourinary:  Negative for dysuria and hematuria.   Musculoskeletal:  Negative for neck stiffness.   Skin:  Negative for color change and pallor.   Allergic/Immunologic: Negative for immunocompromised state.   Neurological:  Negative for vertigo, seizures, syncope, weakness and numbness.   Hematological:  Negative for adenopathy.   Psychiatric/Behavioral:  Negative for hallucinations, sleep disturbance and suicidal ideas.        I have reviewed and confirmed the accuracy of the ROS as documented by the MA/LPN/RN Shahid Rousseau MD      Objective   /62 (BP Location: Right arm, Patient Position: Sitting, Cuff Size: Adult)   Pulse 79   Temp 98.7 °F (37.1 °C) (Temporal)   Resp 18   Ht 182.9 cm (72.01\")   Wt 75.8 kg (167 lb 3.2 oz)   SpO2 97% Comment: room air  BMI 22.67 kg/m²   BP Readings from Last 3 Encounters:   01/15/25 118/62   09/04/24 131/94   04/05/24 128/72     Wt Readings from Last 3 Encounters:   01/15/25 75.8 kg (167 lb 3.2 oz)   09/04/24 73.5 kg (162 lb)   04/05/24 75.5 kg (166 lb 6.4 oz)     Physical Exam  Constitutional:       Appearance: Normal appearance. He is well-developed. He is not diaphoretic.   HENT:      Head: Normocephalic and atraumatic.      Right Ear: Tympanic membrane, ear " "canal and external ear normal.      Left Ear: Tympanic membrane, ear canal and external ear normal.      Nose: Nose normal.      Mouth/Throat:      Mouth: Mucous membranes are moist.   Eyes:      General: Lids are normal.      Extraocular Movements: Extraocular movements intact.      Conjunctiva/sclera: Conjunctivae normal.      Pupils: Pupils are equal, round, and reactive to light.   Neck:      Thyroid: No thyromegaly.      Vascular: No carotid bruit or JVD.      Trachea: No tracheal deviation.   Cardiovascular:      Rate and Rhythm: Normal rate and regular rhythm.      Heart sounds: Normal heart sounds. No murmur heard.     No friction rub. No gallop.   Pulmonary:      Effort: Pulmonary effort is normal.      Breath sounds: Normal breath sounds. No stridor. No decreased breath sounds, wheezing or rales.   Abdominal:      General: Bowel sounds are normal. There is no distension.      Palpations: Abdomen is soft. There is no mass.      Tenderness: There is no abdominal tenderness. There is no guarding or rebound.      Hernia: No hernia is present.   Lymphadenopathy:      Head:      Right side of head: No submental, submandibular, tonsillar, preauricular, posterior auricular or occipital adenopathy.      Left side of head: No submental, submandibular, tonsillar, preauricular, posterior auricular or occipital adenopathy.      Cervical: No cervical adenopathy.   Skin:     General: Skin is warm and dry.      Coloration: Skin is not pale.   Neurological:      Mental Status: He is alert and oriented to person, place, and time.      Cranial Nerves: No cranial nerve deficit.      Sensory: No sensory deficit.      Coordination: Coordination normal.      Gait: Gait normal.      Deep Tendon Reflexes: Reflexes are normal and symmetric.         Data / Lab Results:    No results found for: \"HGBA1C\"  Lab Results   Component Value Date    Glucose 85 08/13/2024    Glucose, UA Negative 01/24/2020     Lab Results   Component Value " "Date    LDL 91 08/13/2024    LDL 85 05/25/2023    LDL 84 12/03/2021     Lab Results   Component Value Date    CHOL 146 01/28/2019    CHOL 135 01/18/2019    CHOL 129 02/16/2018     Lab Results   Component Value Date    TRIG 101 08/13/2024    TRIG 80 05/25/2023    TRIG 113 12/03/2021     Lab Results   Component Value Date    HDL 46 08/13/2024    HDL 43 05/25/2023    HDL 52 12/03/2021     Lab Results   Component Value Date    PSA 0.7 08/13/2024    PSA 0.6 01/31/2020     Lab Results   Component Value Date    WBC 4.2 08/13/2024    HGB 15.9 08/13/2024    HCT 48.3 08/13/2024    MCV 90 08/13/2024     08/13/2024     Lab Results   Component Value Date    TSH 0.386 (L) 08/13/2024    W8UBSHP 83 01/28/2019    THYROIDAB <1 01/28/2019      Lab Results   Component Value Date    GLUCOSE 85 08/13/2024    BUN 12 08/13/2024    CREATININE 1.05 08/13/2024    EGFRIFNONA 99 12/03/2021    EGFRIFAFRI 115 12/03/2021    BCR 11 08/13/2024    K 4.4 08/13/2024    CO2 26 08/13/2024    CALCIUM 9.8 08/13/2024    PROTENTOTREF 6.6 08/13/2024    ALBUMIN 4.4 08/13/2024    LABIL2 2.1 05/25/2023    AST 17 08/13/2024    ALT 16 08/13/2024     No results found for: \"JANUARY\", \"RF\", \"SEDRATE\"   Lab Results   Component Value Date    CRP 0.8 02/16/2018      No results found for: \"IRON\", \"TIBC\", \"FERRITIN\"   No results found for: \"KBIHRHPJ54\"     Procedure note: KG today with normal sinus rhythm, incomplete right bundle branch block, flipped T's in V2, no significant ST or T wave changes, no change from prior EKG 5/23/2012  Assessment & Plan      Medications        Problem List         LOS    Health maintenance.  Doing well.  Vaccines declined.  Start daily health maintenance, screening test, lifestyle modification.  Screening labs drawn.  Anxiety.  Doing well on Effexor, did not tolerate attempt at wean.  DDX includes bipolar,   has done well on Lamictal in the past, consider restart if worsening symptoms.  With sleeping difficulty add melatonin.  Consider " psychiatry referral.  Good social support.    Did not tolerate PRN mirtazapine.  Remote history of prescription narcotic abuse, resolved.  Follow-up recheck, follow-up visit scheduled in January 2022.  Headache.  Improved today possible migraine.  MRI has been recommended in the past, consider scheduling symptoms.  Nocturnal shaking.  Possibly secondary to sleep-related movement disorder.  Check EEG.  Follow-up recheck.  Consider neurology eval if persistent symptoms.  Subclinical hyperthyroidism.  History of, thyroid uptake scan normal 2013.  Thyroid levels have normalized, continue to monitor  Tobacco use.  chews.  Encourage cessation.  See dentist regularly.  BPH.  Has had urology eval in the past.  Stop chewing.  PSA normal 2020.  Start Flomax.  Start PRN Levitra.  Follow-up recheck.  Call or return if worsening symptoms.  Recommend urology follow-up  Sleep disorder.  Did not tolerate mirtazapine.  Has seen [facility], sleep eval upcoming.  Palpitations.  Intermittent by past week, EKG benign today.  He does not drink much caffeine.  Holter placed, check blood work, echo.  Consider cardiology referral if worsening symptoms.      Diagnoses and all orders for this visit:    1. Rapid heart beat (Primary)  -     ECG 12 Lead  -     Comprehensive Metabolic Panel  -     CBC & Differential  -     TSH  -     T4, Free  -     Calcium, Ionized  -     Magnesium    2. Palpitations  -     ECG 12 Lead  -     Comprehensive Metabolic Panel  -     CBC & Differential  -     TSH  -     T4, Free  -     Calcium, Ionized  -     Magnesium  -     Adult Transthoracic Echo Complete W/ Cont if Necessary Per Protocol; Future  -     Holter Monitor - 72 Hour Up To 15 Days; Future    3. Hyperthyroidism  -     TSH  -     T4, Free    4. Malaise and fatigue  -     Comprehensive Metabolic Panel  -     CBC & Differential  -     TSH  -     T4, Free  -     Calcium, Ionized  -     Magnesium    5. Other chest pain  -     Adult Transthoracic Echo Complete W/  Cont if Necessary Per Protocol; Future  -     Holter Monitor - 72 Hour Up To 15 Days; Future              Expected course, medications, and adverse effects discussed.  Call or return if worsening or persistent symptoms.  I wore protective equipment throughout this patient encounter including a mask, gloves, and eye protection.  Hand hygiene was performed before donning protective equipment and after removal when leaving the room. The complete contents of the Assessment and Plan and Data/Lab Results as documented above have been reviewed and addressed by myself with the patient today as part of an ongoing evaluation / treatment plan.  If some of the documentation has been copied from a previous note and is unchanged it indicates that this problem / plan has been assessed today but is stable from a previous visit and no changes have been recommended.

## 2025-01-15 ENCOUNTER — OFFICE VISIT (OUTPATIENT)
Dept: FAMILY MEDICINE CLINIC | Facility: CLINIC | Age: 40
End: 2025-01-15
Payer: COMMERCIAL

## 2025-01-15 VITALS
BODY MASS INDEX: 22.65 KG/M2 | WEIGHT: 167.2 LBS | HEART RATE: 79 BPM | HEIGHT: 72 IN | SYSTOLIC BLOOD PRESSURE: 118 MMHG | RESPIRATION RATE: 18 BRPM | OXYGEN SATURATION: 97 % | DIASTOLIC BLOOD PRESSURE: 62 MMHG | TEMPERATURE: 98.7 F

## 2025-01-15 DIAGNOSIS — R53.83 MALAISE AND FATIGUE: ICD-10-CM

## 2025-01-15 DIAGNOSIS — R00.2 PALPITATIONS: ICD-10-CM

## 2025-01-15 DIAGNOSIS — R00.0 RAPID HEART BEAT: Primary | ICD-10-CM

## 2025-01-15 DIAGNOSIS — E05.90 HYPERTHYROIDISM: ICD-10-CM

## 2025-01-15 DIAGNOSIS — R53.81 MALAISE AND FATIGUE: ICD-10-CM

## 2025-01-15 DIAGNOSIS — R07.89 OTHER CHEST PAIN: ICD-10-CM

## 2025-01-15 PROCEDURE — 99214 OFFICE O/P EST MOD 30 MIN: CPT | Performed by: FAMILY MEDICINE

## 2025-01-15 PROCEDURE — 93000 ELECTROCARDIOGRAM COMPLETE: CPT | Performed by: FAMILY MEDICINE

## 2025-01-16 ENCOUNTER — HOSPITAL ENCOUNTER (OUTPATIENT)
Dept: CARDIOLOGY | Facility: HOSPITAL | Age: 40
Discharge: HOME | End: 2025-01-16
Payer: COMMERCIAL

## 2025-01-16 VITALS
HEIGHT: 72 IN | SYSTOLIC BLOOD PRESSURE: 118 MMHG | WEIGHT: 167.11 LBS | BODY MASS INDEX: 22.63 KG/M2 | DIASTOLIC BLOOD PRESSURE: 86 MMHG

## 2025-01-16 DIAGNOSIS — R00.2 PALPITATIONS: ICD-10-CM

## 2025-01-16 DIAGNOSIS — R07.89 OTHER CHEST PAIN: ICD-10-CM

## 2025-01-16 LAB
ALBUMIN SERPL-MCNC: 4.5 G/DL (ref 4.1–5.1)
ALP SERPL-CCNC: 97 IU/L (ref 44–121)
ALT SERPL-CCNC: 14 IU/L (ref 0–44)
AST SERPL-CCNC: 14 IU/L (ref 0–40)
AV MEAN PRESS GRAD SYS DOP V1V2: 1.89 MMHG
AV VMAX SYS DOP: 102.8 CM/SEC
BASOPHILS # BLD AUTO: 0 X10E3/UL (ref 0–0.2)
BASOPHILS NFR BLD AUTO: 1 %
BH CV ECHO MEAS - ACS: 1.77 CM
BH CV ECHO MEAS - AI P1/2T: 536.8 MSEC
BH CV ECHO MEAS - AO MAX PG: 4.3 MMHG
BH CV ECHO MEAS - AO ROOT DIAM: 3.2 CM
BH CV ECHO MEAS - AO V2 VTI: 18.1 CM
BH CV ECHO MEAS - AVA(I,D): 3.5 CM2
BH CV ECHO MEAS - EDV(CUBED): 97.6 ML
BH CV ECHO MEAS - EDV(MOD-SP4): 80.8 ML
BH CV ECHO MEAS - EF(MOD-SP4): 48 %
BH CV ECHO MEAS - ESV(CUBED): 40.8 ML
BH CV ECHO MEAS - ESV(MOD-SP4): 42 ML
BH CV ECHO MEAS - FS: 25.2 %
BH CV ECHO MEAS - IVS/LVPW: 1.03 CM
BH CV ECHO MEAS - IVSD: 1 CM
BH CV ECHO MEAS - LA DIMENSION: 3 CM
BH CV ECHO MEAS - LV DIASTOLIC VOL/BSA (35-75): 41 CM2
BH CV ECHO MEAS - LV MASS(C)D: 155.9 GRAMS
BH CV ECHO MEAS - LV MAX PG: 3.3 MMHG
BH CV ECHO MEAS - LV MEAN PG: 1.76 MMHG
BH CV ECHO MEAS - LV SYSTOLIC VOL/BSA (12-30): 21.3 CM2
BH CV ECHO MEAS - LV V1 MAX: 91.5 CM/SEC
BH CV ECHO MEAS - LV V1 VTI: 17.5 CM
BH CV ECHO MEAS - LVIDD: 4.6 CM
BH CV ECHO MEAS - LVIDS: 3.4 CM
BH CV ECHO MEAS - LVOT AREA: 3.6 CM2
BH CV ECHO MEAS - LVOT DIAM: 2.15 CM
BH CV ECHO MEAS - LVPWD: 0.97 CM
BH CV ECHO MEAS - MR MAX PG: 120.4 MMHG
BH CV ECHO MEAS - MR MAX VEL: 548.6 CM/SEC
BH CV ECHO MEAS - MV A MAX VEL: 41.5 CM/SEC
BH CV ECHO MEAS - MV DEC SLOPE: 319.7 CM/SEC2
BH CV ECHO MEAS - MV DEC TIME: 0.2 SEC
BH CV ECHO MEAS - MV E MAX VEL: 64.7 CM/SEC
BH CV ECHO MEAS - MV E/A: 1.56
BH CV ECHO MEAS - MV MAX PG: 1.2 MMHG
BH CV ECHO MEAS - MV MEAN PG: 0.62 MMHG
BH CV ECHO MEAS - MV V2 VTI: 16.9 CM
BH CV ECHO MEAS - MVA(VTI): 3.7 CM2
BH CV ECHO MEAS - PA ACC TIME: 0.18 SEC
BH CV ECHO MEAS - PA V2 MAX: 79.1 CM/SEC
BH CV ECHO MEAS - PI END-D VEL: 109.6 CM/SEC
BH CV ECHO MEAS - PULM A REVS DUR: 0.11 SEC
BH CV ECHO MEAS - PULM A REVS VEL: 49.6 CM/SEC
BH CV ECHO MEAS - PULM DIAS VEL: 40.3 CM/SEC
BH CV ECHO MEAS - PULM S/D: 1.33
BH CV ECHO MEAS - PULM SYS VEL: 53.7 CM/SEC
BH CV ECHO MEAS - RAP SYSTOLE: 3 MMHG
BH CV ECHO MEAS - RVSP: 32.7 MMHG
BH CV ECHO MEAS - SV(LVOT): 63.2 ML
BH CV ECHO MEAS - SV(MOD-SP4): 38.8 ML
BH CV ECHO MEAS - SVI(LVOT): 32 ML/M2
BH CV ECHO MEAS - SVI(MOD-SP4): 19.7 ML/M2
BH CV ECHO MEAS - TAPSE (>1.6): 1.5 CM
BH CV ECHO MEAS - TR MAX PG: 29.7 MMHG
BH CV ECHO MEAS - TR MAX VEL: 246 CM/SEC
BH CV XLRA - RV BASE: 3.2 CM
BH CV XLRA - RV MID: 1.6 CM
BILIRUB SERPL-MCNC: 0.4 MG/DL (ref 0–1.2)
BUN SERPL-MCNC: 11 MG/DL (ref 6–24)
BUN/CREAT SERPL: 12 (ref 9–20)
CA-I SERPL ISE-MCNC: 5.3 MG/DL (ref 4.5–5.6)
CALCIUM SERPL-MCNC: 9.7 MG/DL (ref 8.7–10.2)
CHLORIDE SERPL-SCNC: 102 MMOL/L (ref 96–106)
CO2 SERPL-SCNC: 25 MMOL/L (ref 20–29)
CREAT SERPL-MCNC: 0.93 MG/DL (ref 0.76–1.27)
EGFRCR SERPLBLD CKD-EPI 2021: 106 ML/MIN/1.73
EOSINOPHIL # BLD AUTO: 0 X10E3/UL (ref 0–0.4)
EOSINOPHIL NFR BLD AUTO: 1 %
ERYTHROCYTE [DISTWIDTH] IN BLOOD BY AUTOMATED COUNT: 12.1 % (ref 11.6–15.4)
GLOBULIN SER CALC-MCNC: 2.2 G/DL (ref 1.5–4.5)
GLUCOSE SERPL-MCNC: 83 MG/DL (ref 70–99)
HCT VFR BLD AUTO: 47.5 % (ref 37.5–51)
HGB BLD-MCNC: 15.3 G/DL (ref 13–17.7)
IMM GRANULOCYTES # BLD AUTO: 0 X10E3/UL (ref 0–0.1)
IMM GRANULOCYTES NFR BLD AUTO: 0 %
LV EF BIPLANE MOD: 50 %
LYMPHOCYTES # BLD AUTO: 1.3 X10E3/UL (ref 0.7–3.1)
LYMPHOCYTES NFR BLD AUTO: 29 %
MAGNESIUM SERPL-MCNC: 2.3 MG/DL (ref 1.6–2.3)
MCH RBC QN AUTO: 28.6 PG (ref 26.6–33)
MCHC RBC AUTO-ENTMCNC: 32.2 G/DL (ref 31.5–35.7)
MCV RBC AUTO: 89 FL (ref 79–97)
MONOCYTES # BLD AUTO: 0.6 X10E3/UL (ref 0.1–0.9)
MONOCYTES NFR BLD AUTO: 13 %
NEUTROPHILS # BLD AUTO: 2.5 X10E3/UL (ref 1.4–7)
NEUTROPHILS NFR BLD AUTO: 56 %
PLATELET # BLD AUTO: 298 X10E3/UL (ref 150–450)
POTASSIUM SERPL-SCNC: 4.4 MMOL/L (ref 3.5–5.2)
PROT SERPL-MCNC: 6.7 G/DL (ref 6–8.5)
RBC # BLD AUTO: 5.35 X10E6/UL (ref 4.14–5.8)
SODIUM SERPL-SCNC: 140 MMOL/L (ref 134–144)
T4 FREE SERPL-MCNC: 1.21 NG/DL (ref 0.82–1.77)
TSH SERPL DL<=0.005 MIU/L-ACNC: 0.3 UIU/ML (ref 0.45–4.5)
WBC # BLD AUTO: 4.4 X10E3/UL (ref 3.4–10.8)

## 2025-01-16 PROCEDURE — 93242 EXT ECG>48HR<7D RECORDING: CPT

## 2025-01-16 PROCEDURE — 93306 TTE W/DOPPLER COMPLETE: CPT

## 2025-01-27 ENCOUNTER — TELEPHONE (OUTPATIENT)
Dept: FAMILY MEDICINE CLINIC | Facility: CLINIC | Age: 40
End: 2025-01-27
Payer: COMMERCIAL

## 2025-01-27 NOTE — TELEPHONE ENCOUNTER
Caller: Seng Grimaldo    Relationship: Self    Best call back number: [phone]    Caller requesting test results: SELF     What test was performed: ULTRASOUND ON HEART     When was the test performed: 1/17/25    Where was the test performed: [facility]     Additional notes: PLEASE CALL WITH RESULTS

## 2025-01-29 LAB
AV MEAN PRESS GRAD SYS DOP V1V2: 1.89 MMHG
AV VMAX SYS DOP: 102.8 CM/SEC
BH CV ECHO MEAS - ACS: 1.77 CM
BH CV ECHO MEAS - AI P1/2T: 536.8 MSEC
BH CV ECHO MEAS - AO MAX PG: 4.3 MMHG
BH CV ECHO MEAS - AO ROOT DIAM: 3.2 CM
BH CV ECHO MEAS - AO V2 VTI: 18.1 CM
BH CV ECHO MEAS - AVA(I,D): 3.5 CM2
BH CV ECHO MEAS - EDV(CUBED): 97.6 ML
BH CV ECHO MEAS - EDV(MOD-SP4): 78.9 ML
BH CV ECHO MEAS - EF(MOD-SP4): 46.1 %
BH CV ECHO MEAS - ESV(CUBED): 40.8 ML
BH CV ECHO MEAS - ESV(MOD-SP4): 42.5 ML
BH CV ECHO MEAS - FS: 25.2 %
BH CV ECHO MEAS - IVS/LVPW: 1.03 CM
BH CV ECHO MEAS - IVSD: 1 CM
BH CV ECHO MEAS - LA DIMENSION: 3 CM
BH CV ECHO MEAS - LV DIASTOLIC VOL/BSA (35-75): 40 CM2
BH CV ECHO MEAS - LV MASS(C)D: 155.9 GRAMS
BH CV ECHO MEAS - LV MAX PG: 3.3 MMHG
BH CV ECHO MEAS - LV MEAN PG: 1.76 MMHG
BH CV ECHO MEAS - LV SYSTOLIC VOL/BSA (12-30): 21.5 CM2
BH CV ECHO MEAS - LV V1 MAX: 91.5 CM/SEC
BH CV ECHO MEAS - LV V1 VTI: 17.5 CM
BH CV ECHO MEAS - LVIDD: 4.6 CM
BH CV ECHO MEAS - LVIDS: 3.4 CM
BH CV ECHO MEAS - LVOT AREA: 3.6 CM2
BH CV ECHO MEAS - LVOT DIAM: 2.15 CM
BH CV ECHO MEAS - LVPWD: 0.97 CM
BH CV ECHO MEAS - MR MAX PG: 120.4 MMHG
BH CV ECHO MEAS - MR MAX VEL: 548.6 CM/SEC
BH CV ECHO MEAS - MV A MAX VEL: 41.5 CM/SEC
BH CV ECHO MEAS - MV DEC SLOPE: 319.7 CM/SEC2
BH CV ECHO MEAS - MV DEC TIME: 0.2 SEC
BH CV ECHO MEAS - MV E MAX VEL: 64.7 CM/SEC
BH CV ECHO MEAS - MV E/A: 1.56
BH CV ECHO MEAS - MV MAX PG: 1.2 MMHG
BH CV ECHO MEAS - MV MEAN PG: 0.62 MMHG
BH CV ECHO MEAS - MV V2 VTI: 16.9 CM
BH CV ECHO MEAS - MVA(VTI): 3.7 CM2
BH CV ECHO MEAS - PA ACC TIME: 0.18 SEC
BH CV ECHO MEAS - PA V2 MAX: 79.1 CM/SEC
BH CV ECHO MEAS - PI END-D VEL: 109.6 CM/SEC
BH CV ECHO MEAS - PULM A REVS DUR: 0.11 SEC
BH CV ECHO MEAS - PULM A REVS VEL: 49.6 CM/SEC
BH CV ECHO MEAS - PULM DIAS VEL: 40.3 CM/SEC
BH CV ECHO MEAS - PULM S/D: 1.33
BH CV ECHO MEAS - PULM SYS VEL: 53.7 CM/SEC
BH CV ECHO MEAS - RAP SYSTOLE: 3 MMHG
BH CV ECHO MEAS - RVSP: 27.6 MMHG
BH CV ECHO MEAS - SV(LVOT): 63.2 ML
BH CV ECHO MEAS - SV(MOD-SP4): 36.4 ML
BH CV ECHO MEAS - SVI(LVOT): 32 ML/M2
BH CV ECHO MEAS - SVI(MOD-SP4): 18.5 ML/M2
BH CV ECHO MEAS - TAPSE (>1.6): 1.5 CM
BH CV ECHO MEAS - TR MAX PG: 24.6 MMHG
BH CV ECHO MEAS - TR MAX VEL: 246 CM/SEC
BH CV XLRA - RV BASE: 3.2 CM
BH CV XLRA - RV MID: 1.6 CM
LV EF BIPLANE MOD: 50 %

## 2025-01-30 LAB
CV ZIO BASELINE AVG BPM: 78 BPM
CV ZIO BASELINE BPM HIGH: 156 BPM
CV ZIO BASELINE BPM LOW: 46 BPM
CV ZIO DEVICE ANALYSIS TIME: NORMAL
CV ZIO ECT SVE COUNT: 46 EPISODES
CV ZIO ECT SVE CPLT COUNT: 0 EPISODES
CV ZIO ECT SVE CPLT FREQ: 0
CV ZIO ECT SVE FREQ: NORMAL
CV ZIO ECT SVE TPLT COUNT: 0 EPISODES
CV ZIO ECT SVE TPLT FREQ: 0
CV ZIO ECT VE COUNT: 3613 EPISODES
CV ZIO ECT VE CPLT COUNT: 59 EPISODES
CV ZIO ECT VE CPLT FREQ: NORMAL
CV ZIO ECT VE FREQ: NORMAL
CV ZIO ECT VE TPLT COUNT: 3 EPISODES
CV ZIO ECT VE TPLT FREQ: NORMAL
CV ZIO ECTOPIC SVE COUPLET RAW PERCENT: 0 %
CV ZIO ECTOPIC SVE ISOLATED PERCENT: 0.01 %
CV ZIO ECTOPIC SVE TRIPLET RAW PERCENT: 0 %
CV ZIO ECTOPIC VE COUPLET RAW PERCENT: 0.02 %
CV ZIO ECTOPIC VE ISOLATED PERCENT: 0.53 %
CV ZIO ECTOPIC VE TRIPLET RAW PERCENT: 0 %
CV ZIO ENROLLMENT END: NORMAL
CV ZIO ENROLLMENT START: NORMAL
CV ZIO L BIGEMINY DUR: 4.7 SEC
CV ZIO L BIGEMINY END: NORMAL
CV ZIO L BIGEMINY START: NORMAL
CV ZIO PATIENT EVENTS DIARIES: 6
CV ZIO PATIENT EVENTS TRIGGERS: 105
CV ZIO PAUSE COUNT: 0
CV ZIO PRESCRIPTION STATUS: NORMAL
CV ZIO SVT COUNT: 0
CV ZIO TOTAL  ENROLLMENT PERIOD: NORMAL
CV ZIO VT COUNT: 0

## 2025-03-04 NOTE — PROGRESS NOTES
Subjective   Seng Grimaldo is a 39 y.o. male is here for follow up for right scapula pain. Previously patient of Dr. Ambrose transferring care to me due to Dr. Delgadillo's moving. New patient to me.  Seen by Dr. Ambrose on 9/4/2024.  This is 6 months follow-up.    Hx-chronic right chest wall and right scapular pain that started around 2021 while he was working and using a large wrench and felt a popping sensation in the right scapula and right chest area.  After this time, patient experiencing pain and was found to have winging of his scapula.  MRI and showed no abnormalities at the time.  He was working with outside physician with rehab and dry needling with limited benefit.  He was initially sent to Dr. Ambrose for possibility of intercostal nerve blocks.  Also has complaints of neck pain, back pain, hand pain, elbow pain.  He has been doing well with compounded cream and ibuprofen that was prescribed by Dr. Ambrose.  Occasionally he gets With injections with significant improvement in pain.  He is under Worker's Comp. and applying for long-term disability.  He had also undergone EMG study to isolate long thoracic nerve.  He had trigger point injection and right laboratories A muscle area well in 2021 as well.  He has been [facility]ines[facility].  He had also seen someone in [place] for second opinion and was told to continue physical therapy.,  But therapy aggravated his pain.  As per Dr. Tk Albright, Ortho note, they do not anticipate any long-term change without surgical intervention.  He was not candidate for scapula pexy procedure due to rigidity.  He was recommended Dr. Jiménez for second opinion.  There was discussion regarding possibility of scapulothoracic fusion. Patient was told there was no plan for any surgery.     Right chest wall, right scapular pain is 7/10 on VAS, at maximum 9/10.  Pain is aching and stabbing in nature.  Constant throughout the day.   Worse with movement of right arm and right shoulder movement.  Pain makes it difficult for him to work and use his right arm.  Also has right sided occipital neuralgia headache. Dry needling initially helped but not anymore.       Previous Injections:   6/29/2022-intercostal nerve block right ribs 8, 9, 10-unsure benefit, may have got 50% relief for couple days    PMH:   GERD, fatigue, hx of narcotic abuse    Current Medications:   Biomed cream  Ibuprofen  Effexor      Past Medications:  Every muscle relaxants.     Past Modalities:  TENS:       no          Physical Therapy Within The Last 6 Months     yes  Psychotherapy     no  Massage Therapy      no    Patient Complains Of:  Uro-Fecal Incontinence no  Weight Gain/Loss  no  Fever/Chills   no  Weakness   no      PEG Assessment   What number best describes your pain on average in the past week?4  What number best describes how, during the past week, pain has interfered with your enjoyment of life?4  What number best describes how, during the past week, pain has interfered with your general activity?  4    PHQ-9 Depression Screening  Little interest or pleasure in doing things? Not at all   Feeling down, depressed, or hopeless? Not at all   PHQ-2 Total Score 0   Trouble falling or staying asleep, or sleeping too much? Several days   Feeling tired or having little energy? Several days   Poor appetite or overeating? Not at all   Feeling bad about yourself - or that you are a failure or have let yourself or your family down? Not at all   Trouble concentrating on things, such as reading the newspaper or watching television? Several days   Moving or speaking so slowly that other people could have noticed? Or the opposite - being so fidgety or restless that you have been moving around a lot more than usual? Not at all   Thoughts that you would be better off dead, or of hurting yourself in some way? Not at all   PHQ-9 Total Score 3   If you checked off any problems, how  difficult have these problems made it for you to do your work, take care of things at home, or get along with other people? Not difficult at all        Patient screened positive for depression based on a PHQ-9 score of 3 on 3/5/2025. Follow-up recommendations include: Suicide Risk Assessment performed.        Current Outpatient Medications:     Cream Base (VersaPro) cream, , Disp: , Rfl:     ibuprofen (ADVIL,MOTRIN) 800 MG tablet, Take 1 tablet by mouth Every 8 (Eight) Hours As Needed for Moderate Pain., Disp: 90 tablet, Rfl: 5    Ibuprofen 3 %, Gabapentin 10 %, Baclofen 2 %, lidocaine 4 %, Apply 1-2 g topically to the appropriate area as directed 3 (Three) to 4 (Four) times daily., Disp: 90 g, Rfl: 2    venlafaxine XR (EFFEXOR-XR) 150 MG 24 hr capsule, Take 1 capsule by mouth Daily., Disp: 90 capsule, Rfl: 3    Ibuprofen 3 %, Gabapentin 10 %, Baclofen 2 %, lidocaine 4 %, Apply 1-2 g topically to the appropriate area as directed 3 (Three) to 4 (Four) times daily., Disp: 90 g, Rfl: 5    The following portions of the patient's history were reviewed and updated as appropriate: allergies, current medications, past family history, past medical history, past social history, past surgical history, and problem list.      REVIEW OF PERTINENT MEDICAL DATA    Past Medical History:   Diagnosis Date    Abnormal TSH     Acute maxillary sinusitis     Recurrence not specified     Acute upper respiratory infection     Allergic rhinosinusitis     Anxiety     Carpal tunnel syndrome     Chronic leg pain     Chronic pain disorder     Depression     Dysuria     Epididymitis     Fatigue     GERD (gastroesophageal reflux disease)     Hyperthyroidism     Low back pain     Mixed hyperlipidemia 2006    Neck pain     Periumbilical abdominal pain     Scoliosis     Shoulder pain     rt    Strep throat     Substance abuse 2010    Do not want narcotics prescribed due ti history of addiction    Testicular pain     Tick bite     Underweight      Urinary frequency      Past Surgical History:   Procedure Laterality Date    ENDOSCOPY      FRACTURE SURGERY Left     Crush injury, left leg, status post fasciotomy, no hardware in place    OTHER SURGICAL HISTORY      masectomy  left breast    OTHER SURGICAL HISTORY      lengthening of  ach tendon     Family History   Problem Relation Age of Onset    Heart attack Mother 30    Heart attack Father 44    Diabetes Paternal Grandmother     Heart disease Paternal Grandfather         Ischemic      Social History     Socioeconomic History    Marital status:    Tobacco Use    Smoking status: Never    Smokeless tobacco: Current     Types: Chew    Tobacco comments:     1 can per day   Vaping Use    Vaping status: Never Used   Substance and Sexual Activity    Alcohol use: Not Currently    Drug use: Yes     Types: Amphetamines, Hydrocodone, Oxycodone    Sexual activity: Yes     Partners: Female         Review of Systems   Musculoskeletal:  Positive for arthralgias and back pain.         Vitals:    03/05/25 1304   BP: 133/94   Pulse: 78   Resp: 16   SpO2: 99%   Weight: 76.2 kg (168 lb)   PainSc: 4          Objective   Physical Exam  Neck:     Musculoskeletal:        Arms:            Imaging Reviewed:    7/2021-right shoulder MRI  - No abnormalities are identified    6/10/2022-MRI chest without contrast  - No evidence of chest wall or mass or collection seen on examination.    Assessment:    1. Injury of long thoracic nerve, sequela    2. Winged scapula of right side    3. Pain in both lower extremities    4. Myofascial pain    5. Occipital neuralgia of right side         Plan:   1.  Defer UDS for now.  2. We discussed trying a course of formal physical therapy.  Physical therapy can help strengthen and stretch the muscles around the joints. Continue to be as active as possible. Patient has done PT in past.   3. Reviewed Dr. Delgadillo's notes, imaging and other physician's notes.  4.  Continue Ibuprofen 800 mg BID-TID PRN.    5. Continue biomed cream without ketamine.   6.  Follow-up with orthopedic at [facility] or other orthopedic PRN.  7. Offered possible dorsal scapular N block under US. Wife states that she is concerned about things getting worse with nerve block and would like to hold off.   8. R sided headaches consistent with occipital Neuralgia. May consider R occipital N block if needed.      RTC in 6 months.     Artie Perkins DO  Pain Management   [facility]     Greater than 40 minutes was spent with the patient, reviewing records, reviewing images, performing the exam, discussing diagnosis and further treatment options, etc., and greater than 50% was spent on education      INSPECT REPORT    As part of the patient's treatment plan, I may be prescribing controlled substances. The patient has been made aware of appropriate use of such medications, including potential risk of somnolence, limited ability to drive and/or work safely, and the potential for dependence or overdose. It has also been made clear that these medications are for use by this patient only, without concomitant use of alcohol or other substances unless prescribed.     Patient has completed prescribing agreement detailing terms of continued prescribing of controlled substances, including monitoring INSPECT reports, urine drug screening, and pill counts if necessary. The patient is aware that inappropriate use will results in cessation of prescribing such medications.    INSPECT report has been reviewed and scanned into the patient's chart.      EMR Dragon/Transcription Disclaimer:   Much of this encounter note is an electronic transcription/translation of spoken language to printed text. The electronic translation of spoken language may permit erroneous, or at times, nonsensical words or phrases to be inadvertently transcribed; Although I have reviewed the note for such errors, some may still exist.

## 2025-03-05 ENCOUNTER — OFFICE VISIT (OUTPATIENT)
Dept: PAIN MEDICINE | Facility: CLINIC | Age: 40
End: 2025-03-05
Payer: OTHER MISCELLANEOUS

## 2025-03-05 VITALS
DIASTOLIC BLOOD PRESSURE: 94 MMHG | HEART RATE: 78 BPM | BODY MASS INDEX: 22.78 KG/M2 | WEIGHT: 168 LBS | SYSTOLIC BLOOD PRESSURE: 133 MMHG | OXYGEN SATURATION: 99 % | RESPIRATION RATE: 16 BRPM

## 2025-03-05 DIAGNOSIS — M79.18 MYOFASCIAL PAIN: ICD-10-CM

## 2025-03-05 DIAGNOSIS — M79.605 PAIN IN BOTH LOWER EXTREMITIES: ICD-10-CM

## 2025-03-05 DIAGNOSIS — M54.81 OCCIPITAL NEURALGIA OF RIGHT SIDE: ICD-10-CM

## 2025-03-05 DIAGNOSIS — M79.604 PAIN IN BOTH LOWER EXTREMITIES: ICD-10-CM

## 2025-03-05 DIAGNOSIS — S24.8XXS INJURY OF LONG THORACIC NERVE, SEQUELA: Primary | ICD-10-CM

## 2025-03-05 DIAGNOSIS — M95.8 WINGED SCAPULA OF RIGHT SIDE: ICD-10-CM

## 2025-03-05 RX ORDER — IBUPROFEN 800 MG/1
800 TABLET, FILM COATED ORAL EVERY 8 HOURS PRN
Qty: 90 TABLET | Refills: 5 | Status: SHIPPED | OUTPATIENT
Start: 2025-03-05

## 2025-03-05 RX ORDER — CREAM BASE NO.39
CREAM (GRAM) TOPICAL
COMMUNITY
Start: 2025-01-31

## 2025-03-20 ENCOUNTER — TELEPHONE (OUTPATIENT)
Dept: PAIN MEDICINE | Facility: CLINIC | Age: 40
End: 2025-03-20
Payer: COMMERCIAL

## 2025-03-20 NOTE — TELEPHONE ENCOUNTER
Caller: Seng Grimaldo    Relationship: Self    Best call back number:     What form or medical record are you requesting: MEDICAL REQUEST DOCUMENT    Who is requesting this form or medical record from you: MEDICAL NECESSITY DOCUMENT    How would you like to receive the form or medical records (pick-up, mail, fax): FAX  If fax, what is the fax number: [phone]    Timeframe paperwork needed: ASAP    Additional notes: [facility] IS REQUESTING A MEDICAL NECESSITY DOCUMENT TO BE FILLED OUT BY DR. SUN STATING THE PATIENTS NEED FOR THE MEDICATION IN ORDER FOR THE WORKERS COMP TO FILL THE PATIENT MEDICATION REQUEST.

## 2025-03-20 NOTE — TELEPHONE ENCOUNTER
[facility] contacted and will reach out to patient. Dr. Perkins recommended patient pay cash price.

## 2025-04-08 ENCOUNTER — TELEPHONE (OUTPATIENT)
Dept: PAIN MEDICINE | Facility: CLINIC | Age: 40
End: 2025-04-08
Payer: COMMERCIAL

## 2025-04-08 NOTE — TELEPHONE ENCOUNTER
Caller: Seng Grimaldo    Relationship: Self    Best call back number: [phone]    What was the call regarding: PT CALLING TO GET AN UPDATE ON THE MEDICAL NECESSITY FORM THAT WAS REQUESTED FOR PT'S COMPOUND CREAM ORDERED BY DR SUN. REQUEST WAS SENT ON 03/20/25. PLEASE CONTACT PT WITH ANY UPDATES.

## 2025-04-08 NOTE — TELEPHONE ENCOUNTER
Informed pt that we do not handle that paperwork. If pt would like that cream, it is reccommended to pay cash.

## 2025-04-15 DIAGNOSIS — F43.0 ACUTE REACTION TO STRESS: ICD-10-CM

## 2025-04-15 RX ORDER — VENLAFAXINE HYDROCHLORIDE 150 MG/1
150 CAPSULE, EXTENDED RELEASE ORAL DAILY
Qty: 90 CAPSULE | Refills: 3 | Status: SHIPPED | OUTPATIENT
Start: 2025-04-15 | End: 2025-04-17 | Stop reason: SDUPTHER

## 2025-04-15 NOTE — TELEPHONE ENCOUNTER
Caller: Seng Grimaldo    Relationship: Self    Best call back number: [phone]     Requested Prescriptions:   Requested Prescriptions     Pending Prescriptions Disp Refills    venlafaxine XR (EFFEXOR-XR) 150 MG 24 hr capsule 90 capsule 3     Sig: Take 1 capsule by mouth Daily.        Pharmacy where request should be sent: [id number] - [place], [place] - [address] - [phone]  - [phone] FX     Last office visit with prescribing clinician: 1/15/2025   Last telemedicine visit with prescribing clinician: Visit date not found   Next office visit with prescribing clinician: Visit date not found     Additional details provided by patient:     Does the patient have less than a 3 day supply:  [x] Yes  [] No    Would you like a call back once the refill request has been completed: [] Yes [x] No    If the office needs to give you a call back, can they leave a voicemail: [] Yes [x] No    Ab Dela Cruz Rep   04/15/25 10:44 EDT

## 2025-04-17 DIAGNOSIS — F43.0 ACUTE REACTION TO STRESS: ICD-10-CM

## 2025-04-18 RX ORDER — VENLAFAXINE HYDROCHLORIDE 150 MG/1
150 CAPSULE, EXTENDED RELEASE ORAL DAILY
Qty: 90 CAPSULE | Refills: 3 | Status: SHIPPED | OUTPATIENT
Start: 2025-04-18

## 2025-06-16 ENCOUNTER — TELEPHONE (OUTPATIENT)
Dept: PAIN MEDICINE | Facility: CLINIC | Age: 40
End: 2025-06-16
Payer: COMMERCIAL

## 2025-06-16 NOTE — TELEPHONE ENCOUNTER
Caller: GEORGE ZUNIGA    Relationship: [employer] [employer]     Best call back number:     [email or web address]    What form or medical record are you requesting: PROGRESS NOTES AND WORK STATUS FROM 3/5/25    Who is requesting this form or medical record from you: WORK [employer]    How would you like to receive the form or medical records (pick-up, mail, fax): FAX  If fax, what is the fax number: [phone]    Timeframe paperwork needed: ASAP

## 2025-08-06 ENCOUNTER — OFFICE VISIT (OUTPATIENT)
Dept: FAMILY MEDICINE CLINIC | Facility: CLINIC | Age: 40
End: 2025-08-06
Payer: COMMERCIAL

## 2025-08-06 VITALS
HEART RATE: 96 BPM | RESPIRATION RATE: 18 BRPM | WEIGHT: 161.8 LBS | OXYGEN SATURATION: 99 % | TEMPERATURE: 98.6 F | HEIGHT: 72 IN | DIASTOLIC BLOOD PRESSURE: 84 MMHG | BODY MASS INDEX: 21.91 KG/M2 | SYSTOLIC BLOOD PRESSURE: 124 MMHG

## 2025-08-06 DIAGNOSIS — F43.0 ACUTE REACTION TO STRESS: Primary | ICD-10-CM

## 2025-08-06 DIAGNOSIS — F41.9 ANXIETY: ICD-10-CM

## 2025-08-06 PROCEDURE — 99213 OFFICE O/P EST LOW 20 MIN: CPT | Performed by: FAMILY MEDICINE

## 2025-08-06 RX ORDER — CITALOPRAM HYDROBROMIDE 20 MG/1
20 TABLET ORAL DAILY
Qty: 30 TABLET | Refills: 2 | Status: SHIPPED | OUTPATIENT
Start: 2025-08-06

## 2025-08-25 ENCOUNTER — OFFICE VISIT (OUTPATIENT)
Dept: FAMILY MEDICINE CLINIC | Facility: CLINIC | Age: 40
End: 2025-08-25
Payer: COMMERCIAL

## 2025-08-25 VITALS
BODY MASS INDEX: 21.73 KG/M2 | DIASTOLIC BLOOD PRESSURE: 80 MMHG | HEIGHT: 72 IN | RESPIRATION RATE: 18 BRPM | WEIGHT: 160.4 LBS | HEART RATE: 60 BPM | SYSTOLIC BLOOD PRESSURE: 118 MMHG | OXYGEN SATURATION: 98 % | TEMPERATURE: 98.6 F

## 2025-08-25 DIAGNOSIS — E05.90 HYPERTHYROIDISM: ICD-10-CM

## 2025-08-25 DIAGNOSIS — F43.0 ACUTE REACTION TO STRESS: Primary | ICD-10-CM

## 2025-08-25 DIAGNOSIS — F41.9 ANXIETY: ICD-10-CM

## 2025-08-25 PROCEDURE — 99213 OFFICE O/P EST LOW 20 MIN: CPT | Performed by: FAMILY MEDICINE

## 2025-08-25 RX ORDER — DULOXETIN HYDROCHLORIDE 30 MG/1
30 CAPSULE, DELAYED RELEASE ORAL DAILY
Qty: 30 CAPSULE | Refills: 2 | Status: SHIPPED | OUTPATIENT
Start: 2025-08-25

## 2025-08-26 ENCOUNTER — RESULTS FOLLOW-UP (OUTPATIENT)
Dept: FAMILY MEDICINE CLINIC | Facility: CLINIC | Age: 40
End: 2025-08-26
Payer: COMMERCIAL

## 2025-08-26 LAB
T3FREE SERPL-MCNC: 3.6 PG/ML (ref 2–4.4)
T4 FREE SERPL-MCNC: 1.37 NG/DL (ref 0.82–1.77)
TSH SERPL DL<=0.005 MIU/L-ACNC: 0.05 UIU/ML (ref 0.45–4.5)